# Patient Record
Sex: MALE | Race: WHITE | NOT HISPANIC OR LATINO | Employment: UNEMPLOYED | URBAN - NONMETROPOLITAN AREA
[De-identification: names, ages, dates, MRNs, and addresses within clinical notes are randomized per-mention and may not be internally consistent; named-entity substitution may affect disease eponyms.]

---

## 2018-01-03 ENCOUNTER — HOSPITAL ENCOUNTER (OUTPATIENT)
Dept: GENERAL RADIOLOGY | Facility: HOSPITAL | Age: 53
Discharge: HOME | End: 2018-01-03
Admitting: NURSE PRACTITIONER

## 2018-01-03 ENCOUNTER — TRANSCRIBE ORDERS (OUTPATIENT)
Dept: ADMINISTRATIVE | Facility: HOSPITAL | Age: 53
End: 2018-01-03

## 2018-01-03 DIAGNOSIS — M25.529 ELBOW PAIN, UNSPECIFIED LATERALITY: Primary | ICD-10-CM

## 2018-01-03 DIAGNOSIS — M25.529 ELBOW PAIN, UNSPECIFIED LATERALITY: ICD-10-CM

## 2018-01-03 PROCEDURE — 72110 X-RAY EXAM L-2 SPINE 4/>VWS: CPT | Performed by: RADIOLOGY

## 2018-01-03 PROCEDURE — 72110 X-RAY EXAM L-2 SPINE 4/>VWS: CPT

## 2018-01-03 PROCEDURE — 73080 X-RAY EXAM OF ELBOW: CPT

## 2018-01-03 PROCEDURE — 73080 X-RAY EXAM OF ELBOW: CPT | Performed by: RADIOLOGY

## 2018-07-06 ENCOUNTER — OFFICE VISIT (OUTPATIENT)
Dept: UROLOGY | Facility: CLINIC | Age: 53
End: 2018-07-06

## 2018-07-06 VITALS — HEIGHT: 66 IN | BODY MASS INDEX: 21.69 KG/M2 | WEIGHT: 135 LBS

## 2018-07-06 DIAGNOSIS — R97.20 ELEVATED PROSTATE SPECIFIC ANTIGEN (PSA): Primary | ICD-10-CM

## 2018-07-06 PROCEDURE — 84153 ASSAY OF PSA TOTAL: CPT | Performed by: UROLOGY

## 2018-07-06 PROCEDURE — 99204 OFFICE O/P NEW MOD 45 MIN: CPT | Performed by: UROLOGY

## 2018-07-06 PROCEDURE — 84154 ASSAY OF PSA FREE: CPT | Performed by: UROLOGY

## 2018-07-06 RX ORDER — GABAPENTIN 600 MG/1
600 TABLET ORAL 3 TIMES DAILY
COMMUNITY
Start: 2018-06-27

## 2018-07-06 RX ORDER — SUCRALFATE 1 G/1
1 TABLET ORAL 4 TIMES DAILY
COMMUNITY
Start: 2018-06-27 | End: 2020-10-09

## 2018-07-06 RX ORDER — BUSPIRONE HYDROCHLORIDE 7.5 MG/1
7.5 TABLET ORAL 4 TIMES DAILY
COMMUNITY
Start: 2018-06-27 | End: 2020-10-09

## 2018-07-06 RX ORDER — ALBUTEROL SULFATE 90 UG/1
1 AEROSOL, METERED RESPIRATORY (INHALATION) EVERY 4 HOURS PRN
COMMUNITY
Start: 2018-06-27 | End: 2020-10-09

## 2018-07-06 NOTE — PROGRESS NOTES
Chief Complaint:          Chief Complaint   Patient presents with   • Elevated PSA       HPI:   52 y.o. male.  52-year-old white male referred with an elevated PSA of 9.8.  He thinks his dad may have had cancer but is not sure.  He has no lower urinary tract symptomatology specifically, frequency, urgency, burning, blood in the urine etc. he's to see a heart physician but is not sure why.  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  He's had an appendectomy and facial surgery he has low back pain and is a heavy smoker.    Past Medical History:        Past Medical History:   Diagnosis Date   • Elevated PSA          Current Meds:     Current Outpatient Prescriptions   Medication Sig Dispense Refill   • busPIRone (BUSPAR) 7.5 MG tablet      • gabapentin (NEURONTIN) 600 MG tablet      • sucralfate (CARAFATE) 1 g tablet      • VENTOLIN  (90 Base) MCG/ACT inhaler        No current facility-administered medications for this visit.         Allergies:      Allergies   Allergen Reactions   • Penicillins Hives        Past Surgical History:     Past Surgical History:   Procedure Laterality Date   • APPENDECTOMY     • FACIAL RECONSTRUCTION SURGERY           Social History:     Social History     Social History   • Marital status:      Spouse name: N/A   • Number of children: N/A   • Years of education: N/A     Occupational History   • Not on file.     Social History Main Topics   • Smoking status: Current Every Day Smoker   • Smokeless tobacco: Never Used   • Alcohol use No   • Drug use: No   • Sexual activity: Not on file     Other Topics Concern   • Not on file     Social History Narrative   • No narrative on file       Family History:     Family History   Problem Relation Age of Onset   • No Known Problems Father    • No Known Problems Mother        Review of Systems:     Review of Systems   Constitutional: Negative.    HENT: Negative.    Eyes: Negative.    Respiratory: Negative.    Cardiovascular:  Negative.    Gastrointestinal: Negative.    Endocrine: Negative.    Musculoskeletal: Negative.    Allergic/Immunologic: Negative.    Neurological: Negative.    Hematological: Negative.    Psychiatric/Behavioral: Negative.        Physical Exam:     Physical Exam   Constitutional: He is oriented to person, place, and time. He appears well-developed and well-nourished.   HENT:   Head: Normocephalic and atraumatic.   Eyes: Conjunctivae and EOM are normal. Pupils are equal, round, and reactive to light.   Neck: Normal range of motion.   Cardiovascular: Normal rate, regular rhythm, normal heart sounds and intact distal pulses.    Pulmonary/Chest: Effort normal and breath sounds normal.   Abdominal: Soft. Bowel sounds are normal.   Genitourinary:   Genitourinary Comments: Soft nontender abdomen with no organomegaly, rigidity, or tenderness.  He has normal external genitalia and uncircumcised phallus with a freely movable foreskin bilaterally descended testes without masses there is no inguinal hernias adenopathy or abnormalities he had good rectal tone and a large nodular firm prostate.  There is no nodularity or any suspicious rectal abnormalities     Musculoskeletal: Normal range of motion.   Neurological: He is alert and oriented to person, place, and time. He has normal reflexes.   Skin: Skin is warm and dry.   Psychiatric: He has a normal mood and affect. His behavior is normal. Judgment and thought content normal.   Nursing note and vitals reviewed.      I have reviewed the following portions of the patient's history: allergies, current medications, past family history, past medical history, past social history, past surgical history, problem list and ROS and confirm it's accurate.      Procedure:       Assessment/Plan:   Elevated prostate specific antigen-we discussed the diagnosis of elevated prostate-specific antigen.  I explained the pathophysiology of PSA.  It is a serine protease that's function in the male  reproductive tract is to facilitate the liquefaction of semen.  It is for this reason the body does not want it freely floating in the serum and why typically bound tightly to albumin.  We discussed why we used both a PSA free and total to determine the need for more aggressive therapy I discussed the normal range.  Additionally it was in the range of 1-4 but more recently has been downgraded to something less than 2 or even approaching 1.  I discussed the risk of family history particularly the fact that the average male has a 14% risk of prostate cancer and that in the face of a positive diagnosis in a father it will tablet and any other first-generation relative continued tablet insofar that a father and brother with prostate cancer will produce almost a 50% risk of prostate cancer.  I discussed the use of the temporal use of PSA as the best option for monitoring.  We also discussed the fact that an elevated PSA is an isolated event does not mean that this is prostate cancer and should not engender worry in this regard. I discussed other things that can elevate PSA including constipation, prostatitis, infection, recent intercourse etc. as well as the risks and benefits associated with this.  Also discussed the fact that this is with a dilutional test and as a consequence of such were present produce slight variations on a single specimen.  Further discussed the risks and benefits of a prostate biopsy as well.     Patient's Body mass index is 21.79 kg/m². BMI is within normal parameters. No follow-up required.          This document has been electronically signed by LUPE RAE MD July 6, 2018 9:17 AM

## 2018-07-07 LAB
PSA FREE MFR SERPL: 21.3 %
PSA FREE SERPL-MCNC: 1.19 NG/ML
PSA SERPL-MCNC: 5.6 NG/ML (ref 0–4)

## 2018-07-09 ENCOUNTER — TELEPHONE (OUTPATIENT)
Dept: UROLOGY | Facility: CLINIC | Age: 53
End: 2018-07-09

## 2018-07-09 ENCOUNTER — TELEPHONE (OUTPATIENT)
Dept: GASTROENTEROLOGY | Facility: CLINIC | Age: 53
End: 2018-07-09

## 2018-07-09 NOTE — TELEPHONE ENCOUNTER
I got an in-basket message stating that the patient called for PSA results. I called the patient and told him that his PSA was slightly elevated

## 2018-07-11 ENCOUNTER — TRANSCRIBE ORDERS (OUTPATIENT)
Dept: ADMINISTRATIVE | Facility: HOSPITAL | Age: 53
End: 2018-07-11

## 2018-07-11 DIAGNOSIS — M54.16 LUMBAR RADICULAR PAIN: Primary | ICD-10-CM

## 2018-07-11 DIAGNOSIS — M54.12 CERVICAL RADICULITIS: ICD-10-CM

## 2018-07-13 ENCOUNTER — HOSPITAL ENCOUNTER (OUTPATIENT)
Dept: CT IMAGING | Facility: HOSPITAL | Age: 53
Discharge: HOME | End: 2018-07-13

## 2018-07-13 ENCOUNTER — HOSPITAL ENCOUNTER (OUTPATIENT)
Dept: CT IMAGING | Facility: HOSPITAL | Age: 53
Discharge: HOME | End: 2018-07-13
Admitting: NURSE PRACTITIONER

## 2018-07-13 DIAGNOSIS — M54.12 CERVICAL RADICULITIS: ICD-10-CM

## 2018-07-13 DIAGNOSIS — M54.16 LUMBAR RADICULAR PAIN: ICD-10-CM

## 2018-07-13 PROCEDURE — 72131 CT LUMBAR SPINE W/O DYE: CPT

## 2018-07-13 PROCEDURE — 72131 CT LUMBAR SPINE W/O DYE: CPT | Performed by: RADIOLOGY

## 2018-07-13 PROCEDURE — 72125 CT NECK SPINE W/O DYE: CPT

## 2018-07-13 PROCEDURE — 72125 CT NECK SPINE W/O DYE: CPT | Performed by: RADIOLOGY

## 2018-07-31 ENCOUNTER — OFFICE VISIT (OUTPATIENT)
Dept: CARDIOLOGY | Facility: CLINIC | Age: 53
End: 2018-07-31

## 2018-07-31 DIAGNOSIS — R06.09 DYSPNEA ON EXERTION: ICD-10-CM

## 2018-07-31 DIAGNOSIS — R07.9 CHEST PAIN, UNSPECIFIED TYPE: Primary | ICD-10-CM

## 2018-07-31 PROCEDURE — 93000 ELECTROCARDIOGRAM COMPLETE: CPT | Performed by: INTERNAL MEDICINE

## 2018-07-31 PROCEDURE — 99204 OFFICE O/P NEW MOD 45 MIN: CPT | Performed by: INTERNAL MEDICINE

## 2018-07-31 RX ORDER — ACETAMINOPHEN AND CODEINE PHOSPHATE 300; 30 MG/1; MG/1
1 TABLET ORAL EVERY 4 HOURS PRN
Refills: 0 | COMMUNITY
Start: 2018-07-24 | End: 2020-10-09

## 2018-07-31 NOTE — PROGRESS NOTES
[facility] CARDIOLOGY  [address]  [place] [address]  Phone: [phone]  Fax: [phone]    07/31/2018    Chief Complaint: New Patient for chest pain    History:   Lui Medina is a 53 y.o. male seen as a self referral, No cardiac hx. 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 No cardiac evaluation as adult. Heart or chest pain called spasms daily lasting 30 to 60 minutes. Some SOB. ASA helps.  Leg pains. Falls easily. Smoker. Chronic back pain.      Past Medical History:   Diagnosis Date   • Elevated PSA        Past Social History:  Social History     Social History   • Marital status:      Social History Main Topics   • Smoking status: Current Every Day Smoker   • Smokeless tobacco: Never Used   • Alcohol use No   • Drug use: No     Other Topics Concern   • Not on file       Past Family History:  Family History   Problem Relation Age of Onset   • No Known Problems Father    • No Known Problems Mother        Review of Systems:   Please see HPI  Constitution: No chills, no rigors, no unexplained weight loss or weight gain  Eyes:  No diplopia, no blurred vision, no loss of vision, conjunctiva is pink and sclera is anicteric  ENT:  No tinnitus, no otorrhea, no epistaxis, no sore throat   Respiratory: No cough, no hemoptysis  Cardiovascular: see HPI  Gastrointestinal: No nausea, no vomiting, no hematemesis, no diarrhea or constipation, no melena  Genitourinary: No frequency of dysuria no hematuria  Integument: No pruritis and  no skin rash  Hematologic / Lymphatic: No excessive bleeding, easy bruising, fatigue, lymphadenopathy and petechiae  Musculoskeletal: No joint pain, joint stiffness, joint swelling, muscle pain, muscle weakness and neck pain  Neurological: No dizziness, headaches, light headedness, seizures and vertigo  Endocrine: No frequent urination and nocturia, temperature intolerance, weight gain, unintended and weight loss,  unintended      Current Outpatient Prescriptions on File Prior to Visit   Medication Sig Dispense Refill   • busPIRone (BUSPAR) 7.5 MG tablet      • gabapentin (NEURONTIN) 600 MG tablet      • sucralfate (CARAFATE) 1 g tablet      • VENTOLIN  (90 Base) MCG/ACT inhaler        No current facility-administered medications on file prior to visit.        Allergies   Allergen Reactions   • Penicillins Hives       Objective:  There were no vitals filed for this visit.  Comfortable NAD  PERRL, conjunctiva clear  Neck supple, no JVD or thyromegaly appreciated  S1/S2 RRR, no m/r/g  Lungs CTA B, normal effort  Abdomen S/NT/ND (+) BS, no HSM appreciated  Extremities warm, no clubbing, cyanosis, or edema  Normal gait  No visible or palpable skin lesions  A/Ox4, mood and affect appropriate  Pulse exam: Fredrick's:    DATA:                              ECG 12 Lead  Date/Time: 7/31/2018 3:32 PM  Performed by: GHAZALA BARRIENTOS  Authorized by: GHAZALA BARRIENTOS                 I personally viewed and interpreted the patient's EKG: Nml      A/P: Chest pain: evaluate with echo and  Stress test.    F/u per phone  To relay results    Thank you for allowing me to participate in the care of Lui BETSY Watsoner. Feel free to contact me directly with any further questions or concerns.

## 2018-08-06 ENCOUNTER — OFFICE VISIT (OUTPATIENT)
Dept: UROLOGY | Facility: CLINIC | Age: 53
End: 2018-08-06

## 2018-08-06 VITALS — WEIGHT: 134.92 LBS | HEIGHT: 66 IN | BODY MASS INDEX: 21.68 KG/M2

## 2018-08-06 DIAGNOSIS — R97.20 ELEVATED PROSTATE SPECIFIC ANTIGEN (PSA): Primary | ICD-10-CM

## 2018-08-06 PROCEDURE — 99213 OFFICE O/P EST LOW 20 MIN: CPT | Performed by: UROLOGY

## 2018-08-06 NOTE — PROGRESS NOTES
Chief Complaint:          Chief Complaint   Patient presents with   • Elevated PSA     follow up       HPI:   53 y.o. male.    53-year-old white male referred with an elevated PSA of 9.8.  He thinks his dad may have had cancer but is not sure.  He has no lower urinary tract symptomatology specifically, frequency, urgency, burning, blood in the urine etc. he's to see a heart physician but is not sure why.  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  He's had an appendectomy and facial surgery he has low back pain and is a heavy smoker.  Returns today [place] by his girlfriend.  His repeat PSA was 5.6 which is representative of the distinct decrease from his prior high of 9.8 his free PSA was 21.3% indicating a 14% chance of prostate cancer his only other complaint is that of circumferential low back pain and abdominal pain well above the bladder.  I indicated there is no relationship of this to elevated PSA and since he has no voiding dysfunction a course of observation is certainly indicated I'll see him back in 6 months I do not recommend intervention at this time.    Past Medical History:        Past Medical History:   Diagnosis Date   • Elevated PSA          Current Meds:     Current Outpatient Prescriptions   Medication Sig Dispense Refill   • acetaminophen-codeine (TYLENOL #3) 300-30 MG per tablet   0   • busPIRone (BUSPAR) 7.5 MG tablet      • gabapentin (NEURONTIN) 600 MG tablet      • sucralfate (CARAFATE) 1 g tablet      • VENTOLIN  (90 Base) MCG/ACT inhaler        No current facility-administered medications for this visit.         Allergies:      Allergies   Allergen Reactions   • Penicillins Hives        Past Surgical History:     Past Surgical History:   Procedure Laterality Date   • APPENDECTOMY     • FACIAL RECONSTRUCTION SURGERY           Social History:     Social History     Social History   • Marital status:      Spouse name: N/A   • Number of children: N/A   • Years of  education: N/A     Occupational History   • Not on file.     Social History Main Topics   • Smoking status: Current Every Day Smoker   • Smokeless tobacco: Never Used   • Alcohol use No   • Drug use: No   • Sexual activity: Not on file     Other Topics Concern   • Not on file     Social History Narrative   • No narrative on file       Family History:     Family History   Problem Relation Age of Onset   • No Known Problems Father    • No Known Problems Mother        Review of Systems:     Review of Systems   Constitutional: Negative.    HENT: Negative.    Eyes: Negative.    Respiratory: Negative.    Cardiovascular: Negative.    Gastrointestinal: Negative.    Endocrine: Negative.    Musculoskeletal: Negative.    Allergic/Immunologic: Negative.    Neurological: Negative.    Hematological: Negative.    Psychiatric/Behavioral: Negative.        Physical Exam:     Physical Exam   Constitutional: He is oriented to person, place, and time. He appears well-developed and well-nourished.   HENT:   Head: Normocephalic and atraumatic.   Eyes: Pupils are equal, round, and reactive to light. Conjunctivae and EOM are normal.   Neck: Normal range of motion.   Cardiovascular: Normal rate, regular rhythm, normal heart sounds and intact distal pulses.    Pulmonary/Chest: Effort normal and breath sounds normal.   Abdominal: Soft. Bowel sounds are normal.   Musculoskeletal: Normal range of motion.   Neurological: He is alert and oriented to person, place, and time. He has normal reflexes.   Skin: Skin is warm and dry.   Psychiatric: He has a normal mood and affect. His behavior is normal. Judgment and thought content normal.   Nursing note and vitals reviewed.      I have reviewed the following portions of the patient's history: allergies, current medications, past family history, past medical history, past social history, past surgical history, problem list and ROS and confirm it's accurate.      Procedure:       Assessment/Plan:    Elevated prostate specific antigen-we discussed the diagnosis of elevated prostate-specific antigen.  I explained the pathophysiology of PSA.  It is a serine protease that's function in the male reproductive tract is to facilitate the liquefaction of semen.  It is for this reason the body does not want it freely floating in the serum and why typically bound tightly to albumin.  We discussed why we used both a PSA free and total to determine the need for more aggressive therapy I discussed the normal range.  Additionally it was in the range of 1-4 but more recently has been downgraded to something less than 2 or even approaching 1.  I discussed the risk of family history particularly the fact that the average male has a 14% risk of prostate cancer and that in the face of a positive diagnosis in a father it will tablet and any other first-generation relative continued tablet insofar that a father and brother with prostate cancer will produce almost a 50% risk of prostate cancer.  I discussed the use of the temporal use of PSA as the best option for monitoring.  We also discussed the fact that an elevated PSA is an isolated event does not mean that this is prostate cancer and should not engender worry in this regard. I discussed other things that can elevate PSA including constipation, prostatitis, infection, recent intercourse etc. as well as the risks and benefits associated with this.  Also discussed the fact that this is with a dilutional test and as a consequence of such were present produce slight variations on a single specimen.  Further discussed the risks and benefits of a prostate biopsy as well.  In the PSA level I recommend only observation and a recheck in 6 months I stressed the importance of follow-up     Patient's Body mass index is 21.79 kg/m². BMI is within normal parameters. No follow-up required.          This document has been electronically signed by LUPE RAE MD August 6, 2018 10:23  AM

## 2018-08-28 ENCOUNTER — APPOINTMENT (OUTPATIENT)
Dept: NUCLEAR MEDICINE | Facility: HOSPITAL | Age: 53
End: 2018-08-28
Attending: INTERNAL MEDICINE

## 2018-08-28 ENCOUNTER — APPOINTMENT (OUTPATIENT)
Dept: CARDIOLOGY | Facility: HOSPITAL | Age: 53
End: 2018-08-28
Attending: INTERNAL MEDICINE

## 2018-09-20 ENCOUNTER — APPOINTMENT (OUTPATIENT)
Dept: CARDIOLOGY | Facility: HOSPITAL | Age: 53
End: 2018-09-20
Attending: INTERNAL MEDICINE

## 2018-10-10 ENCOUNTER — TELEPHONE (OUTPATIENT)
Dept: CARDIOLOGY | Facility: CLINIC | Age: 53
End: 2018-10-10

## 2018-10-10 NOTE — TELEPHONE ENCOUNTER
Called patient due to seeing he had missed his stress and echo appointment on 9/20/18 and then had also missed his appointment with Dr. Maciel on 10/2. Was going to see if he would like to get his test rescheduled. Wife/girlfriend asked him if he would like to talk to me and was told to take a message.    I told here to please call me back at the cardiology office so that I could get his test rescheduled. She states she will have him call me back. I have also documented this in my procedure logs.

## 2019-01-16 ENCOUNTER — TRANSCRIBE ORDERS (OUTPATIENT)
Dept: ADMINISTRATIVE | Facility: HOSPITAL | Age: 54
End: 2019-01-16

## 2019-01-16 DIAGNOSIS — R10.9 ABDOMINAL PAIN, UNSPECIFIED ABDOMINAL LOCATION: Primary | ICD-10-CM

## 2019-02-20 ENCOUNTER — APPOINTMENT (OUTPATIENT)
Dept: GENERAL RADIOLOGY | Facility: HOSPITAL | Age: 54
End: 2019-02-20

## 2019-02-20 ENCOUNTER — HOSPITAL ENCOUNTER (EMERGENCY)
Facility: HOSPITAL | Age: 54
Discharge: HOME | End: 2019-02-20
Attending: EMERGENCY MEDICINE | Admitting: EMERGENCY MEDICINE

## 2019-02-20 VITALS
SYSTOLIC BLOOD PRESSURE: 141 MMHG | HEIGHT: 66 IN | DIASTOLIC BLOOD PRESSURE: 89 MMHG | HEART RATE: 55 BPM | TEMPERATURE: 97.6 F | RESPIRATION RATE: 18 BRPM | BODY MASS INDEX: 22.5 KG/M2 | WEIGHT: 140 LBS | OXYGEN SATURATION: 100 %

## 2019-02-20 DIAGNOSIS — R07.89 ATYPICAL CHEST PAIN: Primary | ICD-10-CM

## 2019-02-20 LAB
ALBUMIN SERPL-MCNC: 4.6 G/DL (ref 3.5–5)
ALBUMIN/GLOB SERPL: 1.6 G/DL (ref 1.5–2.5)
ALP SERPL-CCNC: 54 U/L (ref 40–129)
ALT SERPL W P-5'-P-CCNC: 28 U/L (ref 10–44)
ANION GAP SERPL CALCULATED.3IONS-SCNC: 2.4 MMOL/L (ref 3.6–11.2)
AST SERPL-CCNC: 28 U/L (ref 10–34)
BASOPHILS # BLD AUTO: 0.04 10*3/MM3 (ref 0–0.3)
BASOPHILS NFR BLD AUTO: 0.6 % (ref 0–2)
BILIRUB SERPL-MCNC: 0.3 MG/DL (ref 0.2–1.8)
BUN BLD-MCNC: 15 MG/DL (ref 7–21)
BUN/CREAT SERPL: 14 (ref 7–25)
CALCIUM SPEC-SCNC: 9.1 MG/DL (ref 7.7–10)
CHLORIDE SERPL-SCNC: 105 MMOL/L (ref 99–112)
CO2 SERPL-SCNC: 30.6 MMOL/L (ref 24.3–31.9)
CREAT BLD-MCNC: 1.07 MG/DL (ref 0.43–1.29)
DEPRECATED RDW RBC AUTO: 44.1 FL (ref 37–54)
EOSINOPHIL # BLD AUTO: 0.13 10*3/MM3 (ref 0–0.7)
EOSINOPHIL NFR BLD AUTO: 1.9 % (ref 0–5)
ERYTHROCYTE [DISTWIDTH] IN BLOOD BY AUTOMATED COUNT: 13.3 % (ref 11.5–14.5)
GFR SERPL CREATININE-BSD FRML MDRD: 73 ML/MIN/1.73
GLOBULIN UR ELPH-MCNC: 2.9 GM/DL
GLUCOSE BLD-MCNC: 99 MG/DL (ref 70–110)
HCT VFR BLD AUTO: 48.6 % (ref 42–52)
HGB BLD-MCNC: 17 G/DL (ref 14–18)
HOLD SPECIMEN: NORMAL
HOLD SPECIMEN: NORMAL
IMM GRANULOCYTES # BLD AUTO: 0.03 10*3/MM3 (ref 0–0.03)
IMM GRANULOCYTES NFR BLD AUTO: 0.4 % (ref 0–0.5)
LYMPHOCYTES # BLD AUTO: 2.63 10*3/MM3 (ref 1–3)
LYMPHOCYTES NFR BLD AUTO: 38.6 % (ref 21–51)
MCH RBC QN AUTO: 32 PG (ref 27–33)
MCHC RBC AUTO-ENTMCNC: 35 G/DL (ref 33–37)
MCV RBC AUTO: 91.4 FL (ref 80–94)
MONOCYTES # BLD AUTO: 0.74 10*3/MM3 (ref 0.1–0.9)
MONOCYTES NFR BLD AUTO: 10.9 % (ref 0–10)
NEUTROPHILS # BLD AUTO: 3.24 10*3/MM3 (ref 1.4–6.5)
NEUTROPHILS NFR BLD AUTO: 47.6 % (ref 30–70)
OSMOLALITY SERPL CALC.SUM OF ELEC: 276.5 MOSM/KG (ref 273–305)
PLATELET # BLD AUTO: 208 10*3/MM3 (ref 130–400)
PMV BLD AUTO: 9.8 FL (ref 6–10)
POTASSIUM BLD-SCNC: 4.2 MMOL/L (ref 3.5–5.3)
PROT SERPL-MCNC: 7.5 G/DL (ref 6–8)
RBC # BLD AUTO: 5.32 10*6/MM3 (ref 4.7–6.1)
SODIUM BLD-SCNC: 138 MMOL/L (ref 135–153)
TROPONIN I SERPL-MCNC: <0.006 NG/ML
WBC NRBC COR # BLD: 6.81 10*3/MM3 (ref 4.5–12.5)
WHOLE BLOOD HOLD SPECIMEN: NORMAL
WHOLE BLOOD HOLD SPECIMEN: NORMAL

## 2019-02-20 PROCEDURE — 93005 ELECTROCARDIOGRAM TRACING: CPT | Performed by: EMERGENCY MEDICINE

## 2019-02-20 PROCEDURE — 71046 X-RAY EXAM CHEST 2 VIEWS: CPT | Performed by: RADIOLOGY

## 2019-02-20 PROCEDURE — 84484 ASSAY OF TROPONIN QUANT: CPT | Performed by: EMERGENCY MEDICINE

## 2019-02-20 PROCEDURE — 80053 COMPREHEN METABOLIC PANEL: CPT | Performed by: EMERGENCY MEDICINE

## 2019-02-20 PROCEDURE — 85025 COMPLETE CBC W/AUTO DIFF WBC: CPT | Performed by: EMERGENCY MEDICINE

## 2019-02-20 PROCEDURE — 93010 ELECTROCARDIOGRAM REPORT: CPT | Performed by: INTERNAL MEDICINE

## 2019-02-20 PROCEDURE — 99285 EMERGENCY DEPT VISIT HI MDM: CPT

## 2019-02-20 PROCEDURE — 71046 X-RAY EXAM CHEST 2 VIEWS: CPT

## 2019-02-20 RX ORDER — ASPIRIN 325 MG
325 TABLET ORAL ONCE
Status: COMPLETED | OUTPATIENT
Start: 2019-02-20 | End: 2019-02-20

## 2019-02-20 RX ORDER — NAPROXEN 500 MG/1
500 TABLET ORAL 2 TIMES DAILY PRN
Qty: 22 TABLET | Refills: 0 | Status: SHIPPED | OUTPATIENT
Start: 2019-02-20 | End: 2020-10-09

## 2019-02-20 RX ORDER — SODIUM CHLORIDE 0.9 % (FLUSH) 0.9 %
10 SYRINGE (ML) INJECTION AS NEEDED
Status: DISCONTINUED | OUTPATIENT
Start: 2019-02-20 | End: 2019-02-20 | Stop reason: HOSPADM

## 2019-02-20 RX ADMIN — ASPIRIN 325 MG: 325 TABLET ORAL at 17:02

## 2020-06-12 ENCOUNTER — TRANSCRIBE ORDERS (OUTPATIENT)
Dept: ADMINISTRATIVE | Facility: HOSPITAL | Age: 55
End: 2020-06-12

## 2020-06-12 ENCOUNTER — LAB (OUTPATIENT)
Dept: LAB | Facility: HOSPITAL | Age: 55
End: 2020-06-12

## 2020-06-12 DIAGNOSIS — Z01.818 PRE-OPERATIVE CLEARANCE: ICD-10-CM

## 2020-06-12 DIAGNOSIS — Z01.818 PRE-OPERATIVE CLEARANCE: Primary | ICD-10-CM

## 2020-06-12 PROCEDURE — U0002 COVID-19 LAB TEST NON-CDC: HCPCS

## 2020-06-12 PROCEDURE — C9803 HOPD COVID-19 SPEC COLLECT: HCPCS

## 2020-06-12 PROCEDURE — U0004 COV-19 TEST NON-CDC HGH THRU: HCPCS

## 2020-06-15 LAB
REF LAB TEST METHOD: NORMAL
SARS-COV-2 RNA RESP QL NAA+PROBE: NOT DETECTED

## 2020-07-01 ENCOUNTER — TRANSCRIBE ORDERS (OUTPATIENT)
Dept: ADMINISTRATIVE | Facility: HOSPITAL | Age: 55
End: 2020-07-01

## 2020-07-01 DIAGNOSIS — S09.93XA BLUNT TRAUMA OF FACE, INITIAL ENCOUNTER: Primary | ICD-10-CM

## 2020-09-25 ENCOUNTER — TRANSCRIBE ORDERS (OUTPATIENT)
Dept: ADMINISTRATIVE | Facility: HOSPITAL | Age: 55
End: 2020-09-25

## 2020-09-25 DIAGNOSIS — R10.33 PERIUMBILICAL PAIN: Primary | ICD-10-CM

## 2020-09-30 ENCOUNTER — HOSPITAL ENCOUNTER (OUTPATIENT)
Dept: ULTRASOUND IMAGING | Facility: HOSPITAL | Age: 55
Discharge: HOME | End: 2020-09-30
Admitting: FAMILY MEDICINE

## 2020-09-30 DIAGNOSIS — R10.33 PERIUMBILICAL PAIN: ICD-10-CM

## 2020-09-30 PROCEDURE — 76700 US EXAM ABDOM COMPLETE: CPT | Performed by: RADIOLOGY

## 2020-09-30 PROCEDURE — 76700 US EXAM ABDOM COMPLETE: CPT

## 2020-10-09 ENCOUNTER — OFFICE VISIT (OUTPATIENT)
Dept: UROLOGY | Facility: CLINIC | Age: 55
End: 2020-10-09

## 2020-10-09 VITALS — HEIGHT: 66 IN | WEIGHT: 147 LBS | TEMPERATURE: 98.7 F | BODY MASS INDEX: 23.63 KG/M2

## 2020-10-09 DIAGNOSIS — R97.20 ELEVATED PROSTATE SPECIFIC ANTIGEN (PSA): Primary | ICD-10-CM

## 2020-10-09 PROCEDURE — 84154 ASSAY OF PSA FREE: CPT | Performed by: UROLOGY

## 2020-10-09 PROCEDURE — 84153 ASSAY OF PSA TOTAL: CPT | Performed by: UROLOGY

## 2020-10-09 PROCEDURE — 99203 OFFICE O/P NEW LOW 30 MIN: CPT | Performed by: UROLOGY

## 2020-10-09 RX ORDER — TAMSULOSIN HYDROCHLORIDE 0.4 MG/1
1 CAPSULE ORAL DAILY
COMMUNITY
Start: 2020-08-27 | End: 2020-10-09

## 2020-10-09 RX ORDER — HYDROCODONE BITARTRATE AND ACETAMINOPHEN 7.5; 325 MG/1; MG/1
1 TABLET ORAL EVERY 6 HOURS PRN
COMMUNITY

## 2020-10-09 NOTE — PROGRESS NOTES
Chief Complaint:          Chief Complaint   Patient presents with   • Elevated PSA     2 yr f/u       HPI:   55 y.o. male is referred for an elevated PSA was 5.6 on July 6, 2018 and again on August 27, 2020 was 6.9 he has a significant positive family history and I have seen him previously he has no urgency.  He did have a failure to keep his appointment he has no burning, blood in the urine, discharge, fevers, chills nausea vomiting etc.  He has a normal phallus normal testes large smooth prostate have a free and total PSA pending and I will follow-up with him based on this      Past Medical History:        Past Medical History:   Diagnosis Date   • Elevated PSA          Current Meds:     Current Outpatient Medications   Medication Sig Dispense Refill   • gabapentin (NEURONTIN) 600 MG tablet Take 600 mg by mouth 3 (Three) Times a Day.     • HYDROcodone-acetaminophen (NORCO) 7.5-325 MG per tablet Take 1 tablet by mouth Every 6 (Six) Hours As Needed for Moderate Pain .       No current facility-administered medications for this visit.         Allergies:      Allergies   Allergen Reactions   • Codeine Nausea Only   • Penicillins Hives        Past Surgical History:     Past Surgical History:   Procedure Laterality Date   • APPENDECTOMY     • FACIAL RECONSTRUCTION SURGERY           Social History:     Social History     Socioeconomic History   • Marital status:      Spouse name: Not on file   • Number of children: Not on file   • Years of education: Not on file   • Highest education level: Not on file   Tobacco Use   • Smoking status: Current Every Day Smoker   • Smokeless tobacco: Never Used   Substance and Sexual Activity   • Alcohol use: No   • Drug use: No       Family History:     Family History   Problem Relation Age of Onset   • No Known Problems Father    • No Known Problems Mother        Review of Systems:     Review of Systems   Constitutional: Negative.    HENT: Negative.    Eyes: Negative.       Respiratory: Negative.    Cardiovascular: Negative.    Gastrointestinal: Negative.    Endocrine: Negative.    Musculoskeletal: Negative.    Allergic/Immunologic: Negative.    Neurological: Negative.    Hematological: Negative.    Psychiatric/Behavioral: Negative.        Physical Exam:     Physical Exam  Vitals signs and nursing note reviewed.   Constitutional:       Appearance: He is well-developed.   HENT:      Head: Normocephalic and atraumatic.   Eyes:      Conjunctiva/sclera: Conjunctivae normal.      Pupils: Pupils are equal, round, and reactive to light.   Neck:      Musculoskeletal: Normal range of motion.   Cardiovascular:      Rate and Rhythm: Normal rate and regular rhythm.      Heart sounds: Normal heart sounds.   Pulmonary:      Effort: Pulmonary effort is normal.      Breath sounds: Normal breath sounds.   Abdominal:      General: Bowel sounds are normal.      Palpations: Abdomen is soft.   Genitourinary:     Comments: Soft nontender abdomen with no organomegaly, rigidity, or tenderness.  He has normal external genitalia and uncircumcised phallus with a freely movable foreskin bilaterally descended testes without masses there is no inguinal hernias adenopathy or abnormalities he had good rectal tone and a large smooth firm prostate.  There is no nodularity or any suspicious rectal abnormalities    Musculoskeletal: Normal range of motion.   Skin:     General: Skin is warm and dry.   Neurological:      Mental Status: He is alert and oriented to person, place, and time.      Deep Tendon Reflexes: Reflexes are normal and symmetric.   Psychiatric:         Behavior: Behavior normal.         Thought Content: Thought content normal.         Judgment: Judgment normal.         I have reviewed the following portions of the patient's history: allergies, current medications, past family history, past medical history, past social history, past surgical history, problem list and ROS and confirm it's  accurate.      Procedure:       Assessment/Plan:   Elevated prostate specific antigen-we discussed the diagnosis of elevated prostate-specific antigen.  I explained the pathophysiology of PSA.  It is a serine protease that's function in the male reproductive tract is to facilitate the liquefaction of semen.  It is for this reason the body does not want it freely floating in the serum and why typically bound tightly to albumin.  We discussed why we used both a PSA free and total to determine the need for more aggressive therapy I discussed the normal range.  Additionally it was in the range of 1-4 but more recently has been downgraded to something less than 2 or even approaching 1.  I discussed the risk of family history particularly the fact that the average male has a 14% risk of prostate cancer and that in the face of a positive diagnosis in a father it will tablet and any other first-generation relative continued tablet insofar that a father and brother with prostate cancer will produce almost a 50% risk of prostate cancer.  I discussed the use of the temporal use of PSA as the best option for monitoring.  We also discussed the fact that an elevated PSA is an isolated event does not mean that this is prostate cancer and should not engender worry in this regard. I discussed other things that can elevate PSA including constipation, prostatitis, infection, recent intercourse etc. as well as the risks and benefits associated with this.  Also discussed the fact that this is with a dilutional test and as a consequence of such were present produce slight variations on a single specimen.  Further discussed the risks and benefits of a prostate biopsy as well.            Patient's Body mass index is 23.73 kg/m². BMI is within normal parameters. No follow-up required..              This document has been electronically signed by LUPE RAE MD October 9, 2020 15:32 EDT

## 2020-10-12 LAB
PSA FREE MFR SERPL: 28.9 %
PSA FREE SERPL-MCNC: 1.76 NG/ML
PSA SERPL-MCNC: 6.1 NG/ML (ref 0–4)

## 2020-11-10 ENCOUNTER — OFFICE VISIT (OUTPATIENT)
Dept: UROLOGY | Facility: CLINIC | Age: 55
End: 2020-11-10

## 2020-11-10 VITALS — TEMPERATURE: 98.9 F | BODY MASS INDEX: 23.63 KG/M2 | WEIGHT: 147.05 LBS | HEIGHT: 66 IN

## 2020-11-10 DIAGNOSIS — R97.20 ELEVATED PROSTATE SPECIFIC ANTIGEN (PSA): Primary | ICD-10-CM

## 2020-11-10 PROCEDURE — 99213 OFFICE O/P EST LOW 20 MIN: CPT | Performed by: UROLOGY

## 2020-11-10 NOTE — PROGRESS NOTES
Chief Complaint:          Chief Complaint   Patient presents with   • Elevated PSA     3 WEEK FOLLOW UP       HPI:   55 y.o. male here for follow-up he had a PSA on October 9 2026.1 in 2018 it was 5.6.  On July 6, 2018 it was 9.8 most recently he is dropped to 6.1 with a free PSA of 28.9% he has swollen feet he has a strong positive family history examination is unremarkable I Paloma recommend continued observation.  Currently stable no other complaints problems PSA has not changed dramatically and if anything is dropped      Past Medical History:        Past Medical History:   Diagnosis Date   • Elevated PSA          Current Meds:     Current Outpatient Medications   Medication Sig Dispense Refill   • gabapentin (NEURONTIN) 600 MG tablet Take 600 mg by mouth 3 (Three) Times a Day.     • HYDROcodone-acetaminophen (NORCO) 7.5-325 MG per tablet Take 1 tablet by mouth Every 6 (Six) Hours As Needed for Moderate Pain .       No current facility-administered medications for this visit.         Allergies:      Allergies   Allergen Reactions   • Codeine Nausea Only   • Penicillins Hives        Past Surgical History:     Past Surgical History:   Procedure Laterality Date   • APPENDECTOMY     • FACIAL RECONSTRUCTION SURGERY           Social History:     Social History     Socioeconomic History   • Marital status:      Spouse name: Not on file   • Number of children: Not on file   • Years of education: Not on file   • Highest education level: Not on file   Tobacco Use   • Smoking status: Current Every Day Smoker     Types: Cigarettes   • Smokeless tobacco: Never Used   Substance and Sexual Activity   • Alcohol use: No   • Drug use: No       Family History:     Family History   Problem Relation Age of Onset   • No Known Problems Father    • No Known Problems Mother        Review of Systems:     Review of Systems   Constitutional: Negative.    HENT: Negative.    Eyes: Negative.    Respiratory: Negative.    Cardiovascular:  Negative.    Gastrointestinal: Negative.    Endocrine: Negative.    Musculoskeletal: Negative.    Allergic/Immunologic: Negative.    Neurological: Negative.    Hematological: Negative.    Psychiatric/Behavioral: Negative.        Physical Exam:     Physical Exam  Vitals signs and nursing note reviewed.   Constitutional:       Appearance: He is well-developed.   HENT:      Head: Normocephalic and atraumatic.   Eyes:      Conjunctiva/sclera: Conjunctivae normal.      Pupils: Pupils are equal, round, and reactive to light.   Neck:      Musculoskeletal: Normal range of motion.   Cardiovascular:      Rate and Rhythm: Normal rate and regular rhythm.      Heart sounds: Normal heart sounds.   Pulmonary:      Effort: Pulmonary effort is normal.      Breath sounds: Normal breath sounds.   Abdominal:      General: Bowel sounds are normal.      Palpations: Abdomen is soft.   Genitourinary:     Penis: Normal.       Scrotum/Testes: Normal.      Prostate: Normal.      Rectum: Normal.   Musculoskeletal: Normal range of motion.   Skin:     General: Skin is warm and dry.   Neurological:      Mental Status: He is alert and oriented to person, place, and time.      Deep Tendon Reflexes: Reflexes are normal and symmetric.   Psychiatric:         Behavior: Behavior normal.         Thought Content: Thought content normal.         Judgment: Judgment normal.         I have reviewed the following portions of the patient's history: allergies, current medications, past family history, past medical history, past social history, past surgical history, problem list and ROS and confirm it's accurate.      Procedure:       Assessment/Plan:   Elevated prostate specific antigen-we discussed the diagnosis of elevated prostate-specific antigen.  I explained the pathophysiology of PSA.  It is a serine protease that's function in the male reproductive tract is to facilitate the liquefaction of semen.  It is for this reason the body does not want it freely  floating in the serum and why typically bound tightly to albumin.  We discussed why we used both a PSA free and total to determine the need for more aggressive therapy I discussed the normal range.  Additionally it was in the range of 1-4 but more recently has been downgraded to something less than 2 or even approaching 1.  I discussed the risk of family history particularly the fact that the average male has a 14% risk of prostate cancer and that in the face of a positive diagnosis in a father it will tablet and any other first-generation relative continued tablet insofar that a father and brother with prostate cancer will produce almost a 50% risk of prostate cancer.  I discussed the use of the temporal use of PSA as the best option for monitoring.  We also discussed the fact that an elevated PSA is an isolated event does not mean that this is prostate cancer and should not engender worry in this regard. I discussed other things that can elevate PSA including constipation, prostatitis, infection, recent intercourse etc. as well as the risks and benefits associated with this.  Also discussed the fact that this is with a dilutional test and as a consequence of such were present produce slight variations on a single specimen.  Further discussed the risks and benefits of a prostate biopsy as well.        Patient reports that he is not currently experiencing any symptoms of urinary incontinence.      Patient's Body mass index is 23.75 kg/m². BMI is within normal parameters. No follow-up required..              This document has been electronically signed by LUPE RAE MD November 10, 2020 13:57 EST

## 2021-09-30 ENCOUNTER — TRANSCRIBE ORDERS (OUTPATIENT)
Dept: ADMINISTRATIVE | Facility: HOSPITAL | Age: 56
End: 2021-09-30

## 2021-09-30 DIAGNOSIS — R10.31 ABDOMINAL PAIN, RIGHT LOWER QUADRANT: Primary | ICD-10-CM

## 2022-01-28 ENCOUNTER — TRANSCRIBE ORDERS (OUTPATIENT)
Dept: ADMINISTRATIVE | Facility: HOSPITAL | Age: 57
End: 2022-01-28

## 2022-01-28 DIAGNOSIS — M50.30 DDD (DEGENERATIVE DISC DISEASE), CERVICAL: Primary | ICD-10-CM

## 2023-03-03 ENCOUNTER — TRANSCRIBE ORDERS (OUTPATIENT)
Dept: ADMINISTRATIVE | Facility: HOSPITAL | Age: 58
End: 2023-03-03
Payer: COMMERCIAL

## 2023-03-03 DIAGNOSIS — R10.84 ABDOMINAL PAIN, GENERALIZED: Primary | ICD-10-CM

## 2023-03-27 ENCOUNTER — TRANSCRIBE ORDERS (OUTPATIENT)
Dept: ADMINISTRATIVE | Facility: HOSPITAL | Age: 58
End: 2023-03-27
Payer: COMMERCIAL

## 2023-03-27 DIAGNOSIS — R10.84 ABDOMINAL PAIN, GENERALIZED: Primary | ICD-10-CM

## 2023-04-27 ENCOUNTER — TRANSCRIBE ORDERS (OUTPATIENT)
Dept: ADMINISTRATIVE | Facility: HOSPITAL | Age: 58
End: 2023-04-27
Payer: COMMERCIAL

## 2023-04-27 DIAGNOSIS — R51.9 LEFT FACIAL PAIN: Primary | ICD-10-CM

## 2023-05-12 ENCOUNTER — HOSPITAL ENCOUNTER (OUTPATIENT)
Dept: CT IMAGING | Facility: HOSPITAL | Age: 58
Discharge: HOME | End: 2023-05-12
Payer: COMMERCIAL

## 2023-05-12 DIAGNOSIS — R51.9 LEFT FACIAL PAIN: ICD-10-CM

## 2023-05-12 DIAGNOSIS — R10.84 ABDOMINAL PAIN, GENERALIZED: ICD-10-CM

## 2023-05-12 LAB — CREAT BLDA-MCNC: 1 MG/DL (ref 0.6–1.3)

## 2023-05-12 PROCEDURE — 74178 CT ABD&PLV WO CNTR FLWD CNTR: CPT

## 2023-05-12 PROCEDURE — 70486 CT MAXILLOFACIAL W/O DYE: CPT

## 2023-05-12 PROCEDURE — 82565 ASSAY OF CREATININE: CPT

## 2023-05-12 PROCEDURE — 70486 CT MAXILLOFACIAL W/O DYE: CPT | Performed by: RADIOLOGY

## 2023-05-12 PROCEDURE — 25510000001 IOPAMIDOL 61 % SOLUTION: Performed by: FAMILY MEDICINE

## 2023-05-12 RX ADMIN — IOPAMIDOL 80 ML: 612 INJECTION, SOLUTION INTRAVENOUS at 11:16

## 2023-07-31 ENCOUNTER — APPOINTMENT (OUTPATIENT)
Dept: CT IMAGING | Facility: HOSPITAL | Age: 58
End: 2023-07-31
Payer: COMMERCIAL

## 2023-07-31 ENCOUNTER — HOSPITAL ENCOUNTER (EMERGENCY)
Facility: HOSPITAL | Age: 58
Discharge: HOME | End: 2023-07-31
Attending: EMERGENCY MEDICINE
Payer: COMMERCIAL

## 2023-07-31 VITALS
HEIGHT: 66 IN | DIASTOLIC BLOOD PRESSURE: 82 MMHG | BODY MASS INDEX: 22.02 KG/M2 | SYSTOLIC BLOOD PRESSURE: 116 MMHG | OXYGEN SATURATION: 98 % | RESPIRATION RATE: 18 BRPM | WEIGHT: 137 LBS | TEMPERATURE: 98.1 F | HEART RATE: 70 BPM

## 2023-07-31 DIAGNOSIS — L02.01 FACIAL ABSCESS: Primary | ICD-10-CM

## 2023-07-31 LAB
ALBUMIN SERPL-MCNC: 4.1 G/DL (ref 3.5–5.2)
ALBUMIN/GLOB SERPL: 1.1 G/DL
ALP SERPL-CCNC: 100 U/L (ref 39–117)
ALT SERPL W P-5'-P-CCNC: 11 U/L (ref 1–41)
ANION GAP SERPL CALCULATED.3IONS-SCNC: 10.9 MMOL/L (ref 5–15)
AST SERPL-CCNC: 20 U/L (ref 1–40)
BASOPHILS # BLD AUTO: 0.04 10*3/MM3 (ref 0–0.2)
BASOPHILS NFR BLD AUTO: 0.4 % (ref 0–1.5)
BILIRUB SERPL-MCNC: 0.3 MG/DL (ref 0–1.2)
BUN SERPL-MCNC: 19 MG/DL (ref 6–20)
BUN/CREAT SERPL: 20.7 (ref 7–25)
CALCIUM SPEC-SCNC: 9.3 MG/DL (ref 8.6–10.5)
CHLORIDE SERPL-SCNC: 101 MMOL/L (ref 98–107)
CO2 SERPL-SCNC: 25.1 MMOL/L (ref 22–29)
CREAT SERPL-MCNC: 0.92 MG/DL (ref 0.76–1.27)
CRP SERPL-MCNC: 2.18 MG/DL (ref 0–0.5)
D-LACTATE SERPL-SCNC: 1.1 MMOL/L (ref 0.5–2)
DEPRECATED RDW RBC AUTO: 43.1 FL (ref 37–54)
EGFRCR SERPLBLD CKD-EPI 2021: 97 ML/MIN/1.73
EOSINOPHIL # BLD AUTO: 0.07 10*3/MM3 (ref 0–0.4)
EOSINOPHIL NFR BLD AUTO: 0.7 % (ref 0.3–6.2)
ERYTHROCYTE [DISTWIDTH] IN BLOOD BY AUTOMATED COUNT: 13.1 % (ref 12.3–15.4)
GLOBULIN UR ELPH-MCNC: 3.6 GM/DL
GLUCOSE SERPL-MCNC: 101 MG/DL (ref 65–99)
HCT VFR BLD AUTO: 49 % (ref 37.5–51)
HGB BLD-MCNC: 16.1 G/DL (ref 13–17.7)
IMM GRANULOCYTES # BLD AUTO: 0.04 10*3/MM3 (ref 0–0.05)
IMM GRANULOCYTES NFR BLD AUTO: 0.4 % (ref 0–0.5)
LYMPHOCYTES # BLD AUTO: 2.36 10*3/MM3 (ref 0.7–3.1)
LYMPHOCYTES NFR BLD AUTO: 22.6 % (ref 19.6–45.3)
MCH RBC QN AUTO: 29.7 PG (ref 26.6–33)
MCHC RBC AUTO-ENTMCNC: 32.9 G/DL (ref 31.5–35.7)
MCV RBC AUTO: 90.4 FL (ref 79–97)
MONOCYTES # BLD AUTO: 0.72 10*3/MM3 (ref 0.1–0.9)
MONOCYTES NFR BLD AUTO: 6.9 % (ref 5–12)
NEUTROPHILS NFR BLD AUTO: 69 % (ref 42.7–76)
NEUTROPHILS NFR BLD AUTO: 7.21 10*3/MM3 (ref 1.7–7)
NRBC BLD AUTO-RTO: 0 /100 WBC (ref 0–0.2)
PLATELET # BLD AUTO: 307 10*3/MM3 (ref 140–450)
PMV BLD AUTO: 8.6 FL (ref 6–12)
POTASSIUM SERPL-SCNC: 4.6 MMOL/L (ref 3.5–5.2)
PROCALCITONIN SERPL-MCNC: <0.02 NG/ML (ref 0–0.25)
PROT SERPL-MCNC: 7.7 G/DL (ref 6–8.5)
RBC # BLD AUTO: 5.42 10*6/MM3 (ref 4.14–5.8)
SODIUM SERPL-SCNC: 137 MMOL/L (ref 136–145)
WBC NRBC COR # BLD: 10.44 10*3/MM3 (ref 3.4–10.8)

## 2023-07-31 PROCEDURE — 87186 SC STD MICRODIL/AGAR DIL: CPT | Performed by: NURSE PRACTITIONER

## 2023-07-31 PROCEDURE — 25010000002 MORPHINE PER 10 MG: Performed by: NURSE PRACTITIONER

## 2023-07-31 PROCEDURE — 87147 CULTURE TYPE IMMUNOLOGIC: CPT | Performed by: NURSE PRACTITIONER

## 2023-07-31 PROCEDURE — 25010000002 ONDANSETRON PER 1 MG: Performed by: NURSE PRACTITIONER

## 2023-07-31 PROCEDURE — 85025 COMPLETE CBC W/AUTO DIFF WBC: CPT | Performed by: NURSE PRACTITIONER

## 2023-07-31 PROCEDURE — 25010000002 HYDROMORPHONE 1 MG/ML SOLUTION: Performed by: NURSE PRACTITIONER

## 2023-07-31 PROCEDURE — 99285 EMERGENCY DEPT VISIT HI MDM: CPT

## 2023-07-31 PROCEDURE — 25510000001 IOPAMIDOL 61 % SOLUTION: Performed by: EMERGENCY MEDICINE

## 2023-07-31 PROCEDURE — 84145 PROCALCITONIN (PCT): CPT | Performed by: NURSE PRACTITIONER

## 2023-07-31 PROCEDURE — 87040 BLOOD CULTURE FOR BACTERIA: CPT | Performed by: NURSE PRACTITIONER

## 2023-07-31 PROCEDURE — 70487 CT MAXILLOFACIAL W/DYE: CPT | Performed by: RADIOLOGY

## 2023-07-31 PROCEDURE — 25010000002 DALBAVANCIN 500 MG RECONSTITUTED SOLUTION 1 EACH VIAL: Performed by: STUDENT IN AN ORGANIZED HEALTH CARE EDUCATION/TRAINING PROGRAM

## 2023-07-31 PROCEDURE — 96375 TX/PRO/DX INJ NEW DRUG ADDON: CPT

## 2023-07-31 PROCEDURE — 87077 CULTURE AEROBIC IDENTIFY: CPT | Performed by: NURSE PRACTITIONER

## 2023-07-31 PROCEDURE — 70487 CT MAXILLOFACIAL W/DYE: CPT

## 2023-07-31 PROCEDURE — 83605 ASSAY OF LACTIC ACID: CPT | Performed by: NURSE PRACTITIONER

## 2023-07-31 PROCEDURE — 96365 THER/PROPH/DIAG IV INF INIT: CPT

## 2023-07-31 PROCEDURE — 86140 C-REACTIVE PROTEIN: CPT | Performed by: NURSE PRACTITIONER

## 2023-07-31 PROCEDURE — 87150 DNA/RNA AMPLIFIED PROBE: CPT | Performed by: NURSE PRACTITIONER

## 2023-07-31 PROCEDURE — 80053 COMPREHEN METABOLIC PANEL: CPT | Performed by: NURSE PRACTITIONER

## 2023-07-31 PROCEDURE — 36415 COLL VENOUS BLD VENIPUNCTURE: CPT

## 2023-07-31 RX ORDER — SODIUM CHLORIDE 0.9 % (FLUSH) 0.9 %
10 SYRINGE (ML) INJECTION AS NEEDED
Status: DISCONTINUED | OUTPATIENT
Start: 2023-07-31 | End: 2023-07-31 | Stop reason: HOSPADM

## 2023-07-31 RX ORDER — ONDANSETRON 2 MG/ML
4 INJECTION INTRAMUSCULAR; INTRAVENOUS ONCE
Status: COMPLETED | OUTPATIENT
Start: 2023-07-31 | End: 2023-07-31

## 2023-07-31 RX ORDER — DEXTROSE MONOHYDRATE 50 MG/ML
10 INJECTION, SOLUTION INTRAVENOUS CONTINUOUS
Status: DISCONTINUED | OUTPATIENT
Start: 2023-07-31 | End: 2023-07-31 | Stop reason: HOSPADM

## 2023-07-31 RX ADMIN — DEXTROSE MONOHYDRATE 10 ML/HR: 50 INJECTION, SOLUTION INTRAVENOUS at 19:56

## 2023-07-31 RX ADMIN — MORPHINE SULFATE 4 MG: 4 INJECTION, SOLUTION INTRAMUSCULAR; INTRAVENOUS at 17:08

## 2023-07-31 RX ADMIN — ONDANSETRON 4 MG: 2 INJECTION INTRAMUSCULAR; INTRAVENOUS at 17:07

## 2023-07-31 RX ADMIN — DALBAVANCIN 1500 MG: 500 INJECTION, POWDER, FOR SOLUTION INTRAVENOUS at 19:56

## 2023-07-31 RX ADMIN — IOPAMIDOL 88 ML: 612 INJECTION, SOLUTION INTRAVENOUS at 17:26

## 2023-07-31 RX ADMIN — HYDROMORPHONE HYDROCHLORIDE 1 MG: 1 INJECTION, SOLUTION INTRAMUSCULAR; INTRAVENOUS; SUBCUTANEOUS at 19:57

## 2023-07-31 NOTE — ED PROVIDER NOTES
Subjective   History of Present Illness  Patient is a 58-year-old white male who comes to the ER today for left-sided facial swelling and pain.  Patient reports that he has noticed some light swelling for about the last month and states for the last few days the swelling got more significant.  Patient reports pain has increased at that time he has a tingling feeling in the left side of his face under the swollen area.  Patient also reports that he has pain trying to eat.  Patient denies any recent trauma.  Patient denies any recent skin injury.  Patient does report that he did have facial reconstruction surgery but its been over a decade ago.    Facial Pain    Review of Systems   Constitutional: Negative.    HENT: Negative.     Eyes: Negative.    Respiratory: Negative.     Cardiovascular: Negative.    Gastrointestinal: Negative.    Endocrine: Negative.    Genitourinary: Negative.    Musculoskeletal: Negative.    Skin: Negative.    Allergic/Immunologic: Negative.    Neurological: Negative.    Hematological: Negative.    Psychiatric/Behavioral: Negative.       Past Medical History:   Diagnosis Date    Elevated PSA        Allergies   Allergen Reactions    Codeine Nausea Only    Penicillins Hives       Past Surgical History:   Procedure Laterality Date    APPENDECTOMY      FACIAL RECONSTRUCTION SURGERY         Family History   Problem Relation Age of Onset    No Known Problems Father     No Known Problems Mother        Social History     Socioeconomic History    Marital status:    Tobacco Use    Smoking status: Every Day     Types: Cigarettes    Smokeless tobacco: Never   Substance and Sexual Activity    Alcohol use: No    Drug use: No           Objective   Physical Exam  Vitals and nursing note reviewed.   Constitutional:       Appearance: He is well-developed.   HENT:      Head: Normocephalic.      Right Ear: External ear normal.      Left Ear: External ear normal.   Eyes:      Conjunctiva/sclera: Conjunctivae  normal.      Pupils: Pupils are equal, round, and reactive to light.   Cardiovascular:      Rate and Rhythm: Normal rate and regular rhythm.      Heart sounds: Normal heart sounds.   Pulmonary:      Effort: Pulmonary effort is normal.      Breath sounds: Normal breath sounds.   Abdominal:      General: Bowel sounds are normal.      Palpations: Abdomen is soft.   Musculoskeletal:         General: Normal range of motion.      Cervical back: Normal range of motion and neck supple.   Skin:     General: Skin is warm and dry.      Capillary Refill: Capillary refill takes less than 2 seconds.      Comments: Left cheek area with moderate swelling, tendernss; fluctuance   Neurological:      Mental Status: He is alert and oriented to person, place, and time.   Psychiatric:         Behavior: Behavior normal.         Thought Content: Thought content normal.       Procedures           ED Course  ED Course as of 08/06/23 2040 Mon Jul 31, 2023 1848 Talked with  MDs. Advise they willl have physician call back [YOVANY]   1931 Discussed patient with Dr. Escalona, transfer physician and Dr. Melara with plastics. Advises that patient can follow up as outpatient and noted they wiil call patient with appt. Patient to be given Dalvance dose. Plastics was in agreeance with this.  [YOVANY]      ED Course User Index  [YOVANY] Abel Mccall, APRN                                           Medical Decision Making  Problems Addressed:  Facial abscess: complicated acute illness or injury    Amount and/or Complexity of Data Reviewed  Labs: ordered.  Radiology: ordered.    Risk  Prescription drug management.        Final diagnoses:   Facial abscess       ED Disposition  ED Disposition       ED Disposition   Discharge    Condition   Stable    Comment   --               Irma Frank MD  [address] [facility] [address]  [phone]    Schedule an appointment as soon as possible for a visit   For further evaluation         Medication List      No  changes were made to your prescriptions during this visit.            Abel Mccall, APRN  08/06/23 0253

## 2023-08-01 LAB — BACTERIA BLD CULT: ABNORMAL

## 2023-08-01 NOTE — DISCHARGE INSTRUCTIONS
[facility] will contact you with appointment time.     Return to the emergency room for worsening symptoms

## 2023-08-04 LAB
BACTERIA SPEC AEROBE CULT: ABNORMAL
GRAM STN SPEC: ABNORMAL
ISOLATED FROM: ABNORMAL

## 2024-07-15 ENCOUNTER — TRANSCRIBE ORDERS (OUTPATIENT)
Dept: ADMINISTRATIVE | Facility: HOSPITAL | Age: 59
End: 2024-07-15
Payer: COMMERCIAL

## 2024-07-17 ENCOUNTER — TRANSCRIBE ORDERS (OUTPATIENT)
Dept: ADMINISTRATIVE | Facility: HOSPITAL | Age: 59
End: 2024-07-17
Payer: COMMERCIAL

## 2024-07-17 DIAGNOSIS — L02.91 ABSCESS: Primary | ICD-10-CM

## 2024-07-31 ENCOUNTER — HOSPITAL ENCOUNTER (OUTPATIENT)
Facility: HOSPITAL | Age: 59
Discharge: HOME | End: 2024-07-31
Admitting: SURGERY
Payer: COMMERCIAL

## 2024-07-31 DIAGNOSIS — L02.91 ABSCESS: ICD-10-CM

## 2024-07-31 PROCEDURE — 70486 CT MAXILLOFACIAL W/O DYE: CPT

## 2024-07-31 PROCEDURE — 70486 CT MAXILLOFACIAL W/O DYE: CPT | Performed by: RADIOLOGY

## 2024-11-14 ENCOUNTER — OFFICE VISIT (OUTPATIENT)
Dept: UROLOGY | Facility: CLINIC | Age: 59
End: 2024-11-14
Payer: COMMERCIAL

## 2024-11-14 VITALS
DIASTOLIC BLOOD PRESSURE: 69 MMHG | HEIGHT: 66 IN | BODY MASS INDEX: 22.14 KG/M2 | WEIGHT: 137.8 LBS | HEART RATE: 67 BPM | SYSTOLIC BLOOD PRESSURE: 120 MMHG

## 2024-11-14 DIAGNOSIS — R97.20 ELEVATED PROSTATE SPECIFIC ANTIGEN (PSA): Primary | ICD-10-CM

## 2024-11-14 DIAGNOSIS — N13.8 ENLARGED PROSTATE WITH URINARY OBSTRUCTION: ICD-10-CM

## 2024-11-14 DIAGNOSIS — N40.1 ENLARGED PROSTATE WITH URINARY OBSTRUCTION: ICD-10-CM

## 2024-11-14 RX ORDER — TAMSULOSIN HYDROCHLORIDE 0.4 MG/1
1 CAPSULE ORAL DAILY
Qty: 90 CAPSULE | Refills: 3 | Status: SHIPPED | OUTPATIENT
Start: 2024-11-14

## 2024-11-14 RX ORDER — DIAZEPAM 10 MG/1
TABLET ORAL
Qty: 2 TABLET | Refills: 0 | Status: SHIPPED | OUTPATIENT
Start: 2024-11-14

## 2024-11-14 RX ORDER — CLINDAMYCIN HYDROCHLORIDE 300 MG/1
300 CAPSULE ORAL 2 TIMES DAILY
Qty: 6 CAPSULE | Refills: 0 | Status: SHIPPED | OUTPATIENT
Start: 2024-11-14 | End: 2024-11-17

## 2024-11-14 RX ORDER — CIPROFLOXACIN 500 MG/1
TABLET, FILM COATED ORAL
Qty: 4 TABLET | Refills: 0 | Status: SHIPPED | OUTPATIENT
Start: 2024-11-14

## 2024-11-14 NOTE — PROGRESS NOTES
"Chief Complaint:    Chief Complaint   Patient presents with    Elevated PSA     New patient       Vital Signs:   /69 (BP Location: Left arm, Patient Position: Sitting, Cuff Size: Adult)   Pulse 67   Ht 167.6 cm (65.98\")   Wt 62.5 kg (137 lb 12.8 oz)   BMI 22.25 kg/m²   Body mass index is 22.25 kg/m².      HPI:  Lui Medina is a 59 y.o. male who presents today for initial evaluation     History of Present Illness  Mr. Medina presents to the clinic to reestablish care.  He has a past medical history significant for enlarged prostate and elevated PSA.  He has been seen previously in 2019 and 2020 by Dr. Jacobsen.  He has had elevated PSAs for several years now.  He does have a notable family history of prostate cancer in his father and brother.  He had a PSA taken in 2019 that came back at 6.6 with a 21% free percentage.  He had a repeat PSA in 2020 that had decreased to 6.1 with a 28% free percentage.  He had a recent PSA taken by his primary care provider on 10/28/2024 that showed slightly increased to 7.3 with a 25% free percentage.  Currently has an IPSS score of 15.  He gets up 2 times throughout the night and endorses significant urgency, frequency, intermittency, and incomplete emptying.  He is not currently taking any medications for the prostate.  He did have a CT scan of the abdomen pelvis completed in May 2023 that did show significant prostatic enlargement however there was heterogenous aspects of the prostate concerning for possible underlying malignancy.  He is unable to do an MRI due to metal throughout his face and upper body.      Past Medical History:  Past Medical History:   Diagnosis Date    Elevated PSA        Current Meds:  Current Outpatient Medications   Medication Sig Dispense Refill    gabapentin (NEURONTIN) 600 MG tablet Take 1 tablet by mouth 3 (Three) Times a Day.      HYDROcodone-acetaminophen (NORCO) 7.5-325 MG per tablet Take 1 tablet by mouth Every 6 (Six) Hours As Needed " for Moderate Pain.      ciprofloxacin (Cipro) 500 MG tablet Take one tablet twice a day before procedure 4 tablet 0    clindamycin (CLEOCIN) 300 MG capsule Take 1 capsule by mouth 2 (Two) Times a Day for 3 days. Start after procedure 6 capsule 0    diazePAM (VALIUM) 10 MG tablet One tablet night before procedure at bedtime and one morning of one hour prior to procedure 2 tablet 0    tamsulosin (FLOMAX) 0.4 MG capsule 24 hr capsule Take 1 capsule by mouth Daily. 90 capsule 3     No current facility-administered medications for this visit.        Allergies:   Allergies   Allergen Reactions    Codeine Nausea Only    Penicillins Hives        Past Surgical History:  Past Surgical History:   Procedure Laterality Date    APPENDECTOMY      FACIAL RECONSTRUCTION SURGERY         Social History:  Social History     Socioeconomic History    Marital status:    Tobacco Use    Smoking status: Every Day     Types: Cigarettes    Smokeless tobacco: Never   Vaping Use    Vaping status: Never Used   Substance and Sexual Activity    Alcohol use: No    Drug use: No       Family History:  Family History   Problem Relation Age of Onset    No Known Problems Father     No Known Problems Mother        Review of Systems:  Review of Systems   Constitutional:  Negative for fatigue, fever and unexpected weight change.   Respiratory:  Positive for cough. Negative for chest tightness and shortness of breath.    Cardiovascular:  Negative for chest pain.   Gastrointestinal:  Positive for diarrhea. Negative for abdominal pain, constipation, nausea and vomiting.   Genitourinary:  Positive for difficulty urinating, frequency and urgency. Negative for dysuria.   Musculoskeletal:  Positive for back pain.   Skin:  Negative for rash.   Psychiatric/Behavioral:  Negative for confusion and suicidal ideas.        Physical Exam:  Physical Exam  Constitutional:       General: He is not in acute distress.     Comments: Unkept appearance   HENT:      Head:  Normocephalic and atraumatic.      Nose: Nose normal.      Mouth/Throat:      Mouth: Mucous membranes are moist.   Eyes:      Conjunctiva/sclera: Conjunctivae normal.   Cardiovascular:      Rate and Rhythm: Normal rate and regular rhythm.      Pulses: Normal pulses.      Heart sounds: Normal heart sounds.   Pulmonary:      Effort: Pulmonary effort is normal.      Breath sounds: Normal breath sounds.   Abdominal:      General: Bowel sounds are normal.      Palpations: Abdomen is soft.   Musculoskeletal:         General: Normal range of motion.      Cervical back: Normal range of motion.   Skin:     General: Skin is warm.   Neurological:      General: No focal deficit present.      Mental Status: He is alert and oriented to person, place, and time.   Psychiatric:         Mood and Affect: Mood normal.         Behavior: Behavior normal.         Thought Content: Thought content normal.         Judgment: Judgment normal.         IPSS Questionnaire (AUA-7):  IPSS Questionnaire (AUA-7):                  IPSS Questionnaire (AUA-7):  Over the past month…    1)  Incomplete Emptying  How often have you had a sensation of not emptying your bladder?  3 - About half the time   2)  Frequency  How often have you had to urinate less than every two hours? 1 - Less than 1 time in 5   3)  Intermittency  How often have you found you stopped and started again several times when you urinated?  5 - Almost always   4) Urgency  How often have you found it difficult to postpone urination?  2 - Less than half the time   5) Weak Stream  How often have you had a weak urinary stream?  1 - Less than 1 time in 5   6) Straining  How often have you had to push or strain to begin urination?  1 - Less than 1 time in 5   7) Nocturia  How many times did you typically get up at night to urinate?  2 - 2 times   Total Score:  15   The International Prostate Symptom Score (IPSS) is used to screen, diagnose, track symptoms of benign prostatic hyperplasia  (BPH).    0-7 pts (Mild Symptoms)  / 8-19 pts (Moderate) / 20-35 (Severe)    Quality of life due to urinary symptoms:  If you were to spend the rest of your life with your urinary condition the way it is now, how would you feel about that? 3-Mixed   Urine Leakage (Incontinence) 0-No Leakage       Recent Image (CT and/or KUB):   CT Abdomen and Pelvis: Results for orders placed during the hospital encounter of 05/12/23    CT Abdomen Pelvis With & Without Contrast    Narrative  EXAM: CT ABDOMEN PELVIS W WO CONTRAST-      TECHNIQUE: Multiple axial CT images were obtained from lung bases  through pubic symphysis WITHOUT AND THEN AFTER THE administration of IV  contrast. Reformatted images in the coronal and/or sagittal plane(s)  were generated from the axial data set to facilitate diagnostic accuracy  and/or surgical planning.  Oral Contrast:NONE.    Radiation dose reduction techniques were utilized per ALARA protocol.  Automated exposure control was initiated through either or [employer] or  [employer] software packages by  protocol.  DOSE: 1855.06 mGy.cm    Clinical information R10.84; R10.84-Generalized abdominal pain    Comparison none immediately available at this time    FINDINGS:    Lower thorax: Clear. No effusions.    Abdomen:    Liver: Homogeneous. No focal hepatic mass or ductal dilatation.    Gallbladder: No dilation or stone identified.    Pancreas: Unremarkable. No mass or ductal dilatation.    Spleen: Homogeneous. No splenomegaly.    Adrenals: No mass.    Kidneys/ureters: No mass. No obstructive uropathy.  No evidence of  urolithiasis.    GI tract: Moderate stool burden. There is no evidence of appendicitis    MESENTERY: No free fluid, walled off fluid collections, mesenteric  stranding, or enlarged lymph nodes      Vasculature: No evidence of aneurysm.    Abdominal wall: No focal hernia or mass.      Bladder: No focal mass or significant wall thickening    Reproductive: Prostate gland is  enlarged    Bones: No acute bony abnormality.    Impression  1. No definitive source for the patient's symptoms identified on today's  exam.      2. Other incidental findings as discussed above.                This report was finalized on 5/15/2023 11:24 AM by Dr. Brenton Griffith MD.     CT Stone Protocol: No results found for this or any previous visit.     KUB: No results found for this or any previous visit.       Labs:  Brief Urine Lab Results       None          No visits with results within 3 Month(s) from this visit.   Latest known visit with results is:   Admission on 07/31/2023, Discharged on 07/31/2023   Component Date Value Ref Range Status    Glucose 07/31/2023 101 (H)  65 - 99 mg/dL Final    BUN 07/31/2023 19  6 - 20 mg/dL Final    Creatinine 07/31/2023 0.92  0.76 - 1.27 mg/dL Final    Sodium 07/31/2023 137  136 - 145 mmol/L Final    Potassium 07/31/2023 4.6  3.5 - 5.2 mmol/L Final    Slight hemolysis detected by analyzer. Results may be affected.    Chloride 07/31/2023 101  98 - 107 mmol/L Final    CO2 07/31/2023 25.1  22.0 - 29.0 mmol/L Final    Calcium 07/31/2023 9.3  8.6 - 10.5 mg/dL Final    Total Protein 07/31/2023 7.7  6.0 - 8.5 g/dL Final    Albumin 07/31/2023 4.1  3.5 - 5.2 g/dL Final    ALT (SGPT) 07/31/2023 11  1 - 41 U/L Final    AST (SGOT) 07/31/2023 20  1 - 40 U/L Final    Alkaline Phosphatase 07/31/2023 100  39 - 117 U/L Final    Total Bilirubin 07/31/2023 0.3  0.0 - 1.2 mg/dL Final    Globulin 07/31/2023 3.6  gm/dL Final    A/G Ratio 07/31/2023 1.1  g/dL Final    BUN/Creatinine Ratio 07/31/2023 20.7  7.0 - 25.0 Final    Anion Gap 07/31/2023 10.9  5.0 - 15.0 mmol/L Final    eGFR 07/31/2023 97.0  >60.0 mL/min/1.73 Final    C-Reactive Protein 07/31/2023 2.18 (H)  0.00 - 0.50 mg/dL Final    Procalcitonin 07/31/2023 <0.02  0.00 - 0.25 ng/mL Final    Blood Culture 07/31/2023 Acinetobacter lwoffii (C)   Final    Isolated from 07/31/2023 Aerobic Bottle   Final    Gram Stain 07/31/2023 Aerobic  Bottle Gram positive cocci in pairs (C)   Final    Gram Stain 07/31/2023 Aerobic Bottle Gram negative cocci (C)   Final    Blood Culture 07/31/2023 Acinetobacter lwoffii (C)   Final    Isolated from 07/31/2023 Aerobic Bottle   Final    Blood Culture 07/31/2023 Staphylococcus, coagulase negative (C)   Final    Isolated from 07/31/2023 Aerobic Bottle   Final    Gram Stain 07/31/2023 Aerobic Bottle Gram positive cocci in pairs (C)   Final    Gram Stain 07/31/2023 Aerobic Bottle Gram negative cocci (C)   Final    Lactate 07/31/2023 1.1  0.5 - 2.0 mmol/L Final    WBC 07/31/2023 10.44  3.40 - 10.80 10*3/mm3 Final    RBC 07/31/2023 5.42  4.14 - 5.80 10*6/mm3 Final    Hemoglobin 07/31/2023 16.1  13.0 - 17.7 g/dL Final    Hematocrit 07/31/2023 49.0  37.5 - 51.0 % Final    MCV 07/31/2023 90.4  79.0 - 97.0 fL Final    MCH 07/31/2023 29.7  26.6 - 33.0 pg Final    MCHC 07/31/2023 32.9  31.5 - 35.7 g/dL Final    RDW 07/31/2023 13.1  12.3 - 15.4 % Final    RDW-SD 07/31/2023 43.1  37.0 - 54.0 fl Final    MPV 07/31/2023 8.6  6.0 - 12.0 fL Final    Platelets 07/31/2023 307  140 - 450 10*3/mm3 Final    Neutrophil % 07/31/2023 69.0  42.7 - 76.0 % Final    Lymphocyte % 07/31/2023 22.6  19.6 - 45.3 % Final    Monocyte % 07/31/2023 6.9  5.0 - 12.0 % Final    Eosinophil % 07/31/2023 0.7  0.3 - 6.2 % Final    Basophil % 07/31/2023 0.4  0.0 - 1.5 % Final    Immature Grans % 07/31/2023 0.4  0.0 - 0.5 % Final    Neutrophils, Absolute 07/31/2023 7.21 (H)  1.70 - 7.00 10*3/mm3 Final    Lymphocytes, Absolute 07/31/2023 2.36  0.70 - 3.10 10*3/mm3 Final    Monocytes, Absolute 07/31/2023 0.72  0.10 - 0.90 10*3/mm3 Final    Eosinophils, Absolute 07/31/2023 0.07  0.00 - 0.40 10*3/mm3 Final    Basophils, Absolute 07/31/2023 0.04  0.00 - 0.20 10*3/mm3 Final    Immature Grans, Absolute 07/31/2023 0.04  0.00 - 0.05 10*3/mm3 Final    nRBC 07/31/2023 0.0  0.0 - 0.2 /100 WBC Final    BCID, PCR 07/31/2023 Staph spp, not aureus or lugdunensis. Identification  by BCID2 PCR. (A)  Negative by BCID PCR. Culture to Follow. Final        Procedure: None  Procedures     Detrusor instability-patient has been diagnosed with detrusor instability which is an irritative bladder symptomatology most likely related to factors such as intake of bladder irritants, postinfectious irritation, prolapse, with a very large differential diagnosis. The mainstay of treatment has been tight cholinergics which basically cause the bladder to have decreased contractility. We have discussed the side effects of these treatments including dry mouth, double vision, and increasing constipation.     Assessment/Plan:   Problem List Items Addressed This Visit       Elevated prostate specific antigen (PSA) - Primary    Relevant Medications    tamsulosin (FLOMAX) 0.4 MG capsule 24 hr capsule    clindamycin (CLEOCIN) 300 MG capsule    ciprofloxacin (Cipro) 500 MG tablet    diazePAM (VALIUM) 10 MG tablet    Enlarged prostate with urinary obstruction    Relevant Medications    tamsulosin (FLOMAX) 0.4 MG capsule 24 hr capsule       Health Maintenance:   Health Maintenance Due   Topic Date Due    COLORECTAL CANCER SCREENING  Never done    Pneumococcal Vaccine 0-64 (1 of 2 - PCV) Never done    TDAP/TD VACCINES (1 - Tdap) Never done    ZOSTER VACCINE (1 of 2) Never done    HEPATITIS C SCREENING  Never done    ANNUAL PHYSICAL  Never done    INFLUENZA VACCINE  Never done    COVID-19 Vaccine (3 - 2024-25 season) 09/01/2024        Smoking Counseling: Everyday smoker.  Never used smokeless tobacco.    Urine Incontinence: Patient reports that he is not currently experiencing any symptoms of urinary incontinence.    Patient was given instructions and counseling regarding his condition or for health maintenance advice. Please see specific information pulled into the AVS if appropriate.    Patient Education:   Enlarged prostate: Discussed the pathophysiology of enlarged prostate and obstruction.  We discussed the static and  dynamic effects of enlarged prostate as well as using 5 alpha reductase inhibitors versus alpha blockade.  We discussed the indications for transurethral surgery as well and/ or other therapeutic options available including all of the newer techniques.  Given patient's currently urinary tract symptoms we will start him on Flomax 0.4 mg once nightly.  Discussed the risk and benefits of alpha blockade including side effects such as lightheadedness, dizziness, blurry vision, double vision, chest pain, shortness of breath, and syncope.  Educated to discontinue medications if any these occur.  He verbalized understanding.  PSA testing -patient's most recent PSA remains elevated at 7.3 with a high free percentage of 28.  Advised him this is a low risk for cancer however patient does have a significant family history of cancer in his brother and father..  I discussed the pathophysiology of PSA testing indicating its use in the diagnosis and management of prostate cancer.  I discussed the normal range being 0 to 4, but more appropriately being much closer to 0 to 2 in a normal male.  I discussed the fact that after a certain age it is against recommendation to use PSA testing especially in view of numerous comorbidities.  I discussed many of the things that can artificially raise PSA including put not limited to a recent infection, urinary tract infection, recent sexual intercourse, or even the type of movement such as manipulation of the prostate from riding a bicycle.  It was discussed that the most important use of PSA is the velocity measurement.  This refers to the change of PSA with time. I discussed that we look for greater than 20% rise over a year to help us make the prediction of prostate cancer.  I also discussed that in the case of prostate cancer indicating a radical prostatectomy, the PSA should be 0 and any rise indicates an early biochemical recurrence.  Patient is unable to undergo an MRI.  Did discuss the  use of a biopsy in office given persistent elevated PSAs, family history, and heterogenicity on CT scan.  Discussed the nature of this procedure in detail.  Discussed the use of antibiotics and enema prior to procedure for infection prophylaxis.  Also discussed the use of Valium for preop use.  Patient does wish to proceed forward with an office biopsy and we will get him scheduled appropriately.    Visit Diagnoses:    ICD-10-CM ICD-9-CM   1. Elevated prostate specific antigen (PSA)  R97.20 790.93   2. Enlarged prostate with urinary obstruction  N40.1 600.01    N13.8 599.69     A total of 30 minutes were spent coordinating this patient’s care in clinic today; 15 minutes of which were face-to-face with the patient, reviewing medical history and counseling on the current treatment and followup plan.  All questions were answered to patient's satisfaction.    Meds Ordered During Visit:  New Medications Ordered This Visit   Medications    tamsulosin (FLOMAX) 0.4 MG capsule 24 hr capsule     Sig: Take 1 capsule by mouth Daily.     Dispense:  90 capsule     Refill:  3    clindamycin (CLEOCIN) 300 MG capsule     Sig: Take 1 capsule by mouth 2 (Two) Times a Day for 3 days. Start after procedure     Dispense:  6 capsule     Refill:  0    ciprofloxacin (Cipro) 500 MG tablet     Sig: Take one tablet twice a day before procedure     Dispense:  4 tablet     Refill:  0    diazePAM (VALIUM) 10 MG tablet     Sig: One tablet night before procedure at bedtime and one morning of one hour prior to procedure     Dispense:  2 tablet     Refill:  0       Follow Up Appointment: Biopsy  No follow-ups on file.      This document has been electronically signed by Phill Felipe PA-C   November 14, 2024 09:20 EST    Part of this note may be an electronic transcription/translation of spoken language to printed text using the Dragon Dictation System.

## 2025-01-21 ENCOUNTER — PROCEDURE VISIT (OUTPATIENT)
Dept: UROLOGY | Facility: CLINIC | Age: 60
End: 2025-01-21
Payer: COMMERCIAL

## 2025-01-21 ENCOUNTER — TELEPHONE (OUTPATIENT)
Dept: GASTROENTEROLOGY | Facility: CLINIC | Age: 60
End: 2025-01-21
Payer: COMMERCIAL

## 2025-01-21 VITALS
HEIGHT: 66 IN | HEART RATE: 59 BPM | DIASTOLIC BLOOD PRESSURE: 62 MMHG | BODY MASS INDEX: 23.37 KG/M2 | WEIGHT: 145.4 LBS | SYSTOLIC BLOOD PRESSURE: 105 MMHG

## 2025-01-21 DIAGNOSIS — N40.1 ENLARGED PROSTATE WITH URINARY OBSTRUCTION: ICD-10-CM

## 2025-01-21 DIAGNOSIS — Z48.816 AFTERCARE FOLLOWING SURGERY OF THE GENITOURINARY SYSTEM: ICD-10-CM

## 2025-01-21 DIAGNOSIS — R97.20 ELEVATED PROSTATE SPECIFIC ANTIGEN (PSA): Primary | ICD-10-CM

## 2025-01-21 DIAGNOSIS — N13.8 ENLARGED PROSTATE WITH URINARY OBSTRUCTION: ICD-10-CM

## 2025-01-21 RX ORDER — GENTAMICIN 40 MG/ML
80 INJECTION, SOLUTION INTRAMUSCULAR; INTRAVENOUS ONCE
Status: COMPLETED | OUTPATIENT
Start: 2025-01-21 | End: 2025-01-21

## 2025-01-21 RX ORDER — MELATONIN 12 MG
133 TABLET,DISINTEGRATING ORAL ONCE
Qty: 133 ML | Refills: 0 | Status: SHIPPED | OUTPATIENT
Start: 2025-01-21 | End: 2025-01-21

## 2025-01-21 RX ADMIN — GENTAMICIN 80 MG: 40 INJECTION, SOLUTION INTRAMUSCULAR; INTRAVENOUS at 15:59

## 2025-01-21 NOTE — PROGRESS NOTES
Chief Complaint:      Chief Complaint   Patient presents with    Prostate Biopsy       HPI:   59 y.o. male here for biopsy.    Past Medical History:     Past Medical History:   Diagnosis Date    Elevated PSA        Current Meds:     Current Outpatient Medications   Medication Sig Dispense Refill    ciprofloxacin (Cipro) 500 MG tablet Take one tablet twice a day before procedure 4 tablet 0    diazePAM (VALIUM) 10 MG tablet One tablet night before procedure at bedtime and one morning of one hour prior to procedure 2 tablet 0    gabapentin (NEURONTIN) 600 MG tablet Take 1 tablet by mouth 3 (Three) Times a Day.      HYDROcodone-acetaminophen (NORCO) 7.5-325 MG per tablet Take 1 tablet by mouth Every 6 (Six) Hours As Needed for Moderate Pain.      Sodium Phosphates ([facility] Enema Ready-to-Use) 7-19 GM/118ML enema Insert 133 mL into the rectum 1 time for 1 dose. 133 mL 0    tamsulosin (FLOMAX) 0.4 MG capsule 24 hr capsule Take 1 capsule by mouth Daily. 90 capsule 3     No current facility-administered medications for this visit.        Allergies:      Allergies   Allergen Reactions    Codeine Nausea Only    Penicillins Hives        Past Surgical History:     Past Surgical History:   Procedure Laterality Date    APPENDECTOMY      FACIAL RECONSTRUCTION SURGERY         Social History:     Social History     Socioeconomic History    Marital status:    Tobacco Use    Smoking status: Every Day     Types: Cigarettes    Smokeless tobacco: Never   Vaping Use    Vaping status: Never Used   Substance and Sexual Activity    Alcohol use: No    Drug use: No       Family History:     Family History   Problem Relation Age of Onset    No Known Problems Father     No Known Problems Mother        Review of Systems:     Review of Systems   Constitutional: Negative.    HENT: Negative.     Eyes: Negative.    Respiratory: Negative.     Cardiovascular: Negative.    Gastrointestinal: Negative.    Endocrine: Negative.    Musculoskeletal:  Negative.    Allergic/Immunologic: Negative.    Neurological: Negative.    Hematological: Negative.    Psychiatric/Behavioral: Negative.         Physical Exam:     Physical Exam  Vitals and nursing note reviewed.   Constitutional:       Appearance: He is well-developed.   HENT:      Head: Normocephalic and atraumatic.   Eyes:      Conjunctiva/sclera: Conjunctivae normal.      Pupils: Pupils are equal, round, and reactive to light.   Cardiovascular:      Rate and Rhythm: Normal rate and regular rhythm.      Heart sounds: Normal heart sounds.   Pulmonary:      Effort: Pulmonary effort is normal.      Breath sounds: Normal breath sounds.   Abdominal:      General: Bowel sounds are normal.      Palpations: Abdomen is soft.   Musculoskeletal:         General: Normal range of motion.      Cervical back: Normal range of motion.   Skin:     General: Skin is warm and dry.   Neurological:      Mental Status: He is alert and oriented to person, place, and time.      Deep Tendon Reflexes: Reflexes are normal and symmetric.   Psychiatric:         Behavior: Behavior normal.         Thought Content: Thought content normal.         Judgment: Judgment normal.         I have reviewed the following portions of the patient's history: Allergies, current medications, past family history, past medical history, past social history, past surgical history, problem list, and ROS and confirm it is accurate.    Recent Image (CT and/or KUB):      CT Abdomen and Pelvis: Results for orders placed during the hospital encounter of 05/12/23    CT Abdomen Pelvis With & Without Contrast    Narrative  EXAM: CT ABDOMEN PELVIS W WO CONTRAST-      TECHNIQUE: Multiple axial CT images were obtained from lung bases  through pubic symphysis WITHOUT AND THEN AFTER THE administration of IV  contrast. Reformatted images in the coronal and/or sagittal plane(s)  were generated from the axial data set to facilitate diagnostic accuracy  and/or surgical  planning.  Oral Contrast:NONE.    Radiation dose reduction techniques were utilized per ALARA protocol.  Automated exposure control was initiated through either or [employer] or  [employer] software packages by  protocol.  DOSE: 1855.06 mGy.cm    Clinical information R10.84; R10.84-Generalized abdominal pain    Comparison none immediately available at this time    FINDINGS:    Lower thorax: Clear. No effusions.    Abdomen:    Liver: Homogeneous. No focal hepatic mass or ductal dilatation.    Gallbladder: No dilation or stone identified.    Pancreas: Unremarkable. No mass or ductal dilatation.    Spleen: Homogeneous. No splenomegaly.    Adrenals: No mass.    Kidneys/ureters: No mass. No obstructive uropathy.  No evidence of  urolithiasis.    GI tract: Moderate stool burden. There is no evidence of appendicitis    MESENTERY: No free fluid, walled off fluid collections, mesenteric  stranding, or enlarged lymph nodes      Vasculature: No evidence of aneurysm.    Abdominal wall: No focal hernia or mass.      Bladder: No focal mass or significant wall thickening    Reproductive: Prostate gland is enlarged    Bones: No acute bony abnormality.    Impression  1. No definitive source for the patient's symptoms identified on today's  exam.      2. Other incidental findings as discussed above.                This report was finalized on 5/15/2023 11:24 AM by Dr. Brenton Griffith MD.       CT Stone Protocol: No results found for this or any previous visit.       KUB: No results found for this or any previous visit.       Labs (past 3 months):      No visits with results within 3 Month(s) from this visit.   Latest known visit with results is:   Admission on 07/31/2023, Discharged on 07/31/2023   Component Date Value Ref Range Status    Glucose 07/31/2023 101 (H)  65 - 99 mg/dL Final    BUN 07/31/2023 19  6 - 20 mg/dL Final    Creatinine 07/31/2023 0.92  0.76 - 1.27 mg/dL Final    Sodium 07/31/2023 137  136 - 145 mmol/L  Final    Potassium 07/31/2023 4.6  3.5 - 5.2 mmol/L Final    Slight hemolysis detected by analyzer. Results may be affected.    Chloride 07/31/2023 101  98 - 107 mmol/L Final    CO2 07/31/2023 25.1  22.0 - 29.0 mmol/L Final    Calcium 07/31/2023 9.3  8.6 - 10.5 mg/dL Final    Total Protein 07/31/2023 7.7  6.0 - 8.5 g/dL Final    Albumin 07/31/2023 4.1  3.5 - 5.2 g/dL Final    ALT (SGPT) 07/31/2023 11  1 - 41 U/L Final    AST (SGOT) 07/31/2023 20  1 - 40 U/L Final    Alkaline Phosphatase 07/31/2023 100  39 - 117 U/L Final    Total Bilirubin 07/31/2023 0.3  0.0 - 1.2 mg/dL Final    Globulin 07/31/2023 3.6  gm/dL Final    A/G Ratio 07/31/2023 1.1  g/dL Final    BUN/Creatinine Ratio 07/31/2023 20.7  7.0 - 25.0 Final    Anion Gap 07/31/2023 10.9  5.0 - 15.0 mmol/L Final    eGFR 07/31/2023 97.0  >60.0 mL/min/1.73 Final    C-Reactive Protein 07/31/2023 2.18 (H)  0.00 - 0.50 mg/dL Final    Procalcitonin 07/31/2023 <0.02  0.00 - 0.25 ng/mL Final    Blood Culture 07/31/2023 Acinetobacter lwoffii (C)   Final    Isolated from 07/31/2023 Aerobic Bottle   Final    Gram Stain 07/31/2023 Aerobic Bottle Gram positive cocci in pairs (C)   Final    Gram Stain 07/31/2023 Aerobic Bottle Gram negative cocci (C)   Final    Blood Culture 07/31/2023 Acinetobacter lwoffii (C)   Final    Isolated from 07/31/2023 Aerobic Bottle   Final    Blood Culture 07/31/2023 Staphylococcus, coagulase negative (C)   Final    Isolated from 07/31/2023 Aerobic Bottle   Final    Gram Stain 07/31/2023 Aerobic Bottle Gram positive cocci in pairs (C)   Final    Gram Stain 07/31/2023 Aerobic Bottle Gram negative cocci (C)   Final    Lactate 07/31/2023 1.1  0.5 - 2.0 mmol/L Final    WBC 07/31/2023 10.44  3.40 - 10.80 10*3/mm3 Final    RBC 07/31/2023 5.42  4.14 - 5.80 10*6/mm3 Final    Hemoglobin 07/31/2023 16.1  13.0 - 17.7 g/dL Final    Hematocrit 07/31/2023 49.0  37.5 - 51.0 % Final    MCV 07/31/2023 90.4  79.0 - 97.0 fL Final    MCH 07/31/2023 29.7  26.6 - 33.0  pg Final    MCHC 07/31/2023 32.9  31.5 - 35.7 g/dL Final    RDW 07/31/2023 13.1  12.3 - 15.4 % Final    RDW-SD 07/31/2023 43.1  37.0 - 54.0 fl Final    MPV 07/31/2023 8.6  6.0 - 12.0 fL Final    Platelets 07/31/2023 307  140 - 450 10*3/mm3 Final    Neutrophil % 07/31/2023 69.0  42.7 - 76.0 % Final    Lymphocyte % 07/31/2023 22.6  19.6 - 45.3 % Final    Monocyte % 07/31/2023 6.9  5.0 - 12.0 % Final    Eosinophil % 07/31/2023 0.7  0.3 - 6.2 % Final    Basophil % 07/31/2023 0.4  0.0 - 1.5 % Final    Immature Grans % 07/31/2023 0.4  0.0 - 0.5 % Final    Neutrophils, Absolute 07/31/2023 7.21 (H)  1.70 - 7.00 10*3/mm3 Final    Lymphocytes, Absolute 07/31/2023 2.36  0.70 - 3.10 10*3/mm3 Final    Monocytes, Absolute 07/31/2023 0.72  0.10 - 0.90 10*3/mm3 Final    Eosinophils, Absolute 07/31/2023 0.07  0.00 - 0.40 10*3/mm3 Final    Basophils, Absolute 07/31/2023 0.04  0.00 - 0.20 10*3/mm3 Final    Immature Grans, Absolute 07/31/2023 0.04  0.00 - 0.05 10*3/mm3 Final    nRBC 07/31/2023 0.0  0.0 - 0.2 /100 WBC Final    BCID, PCR 07/31/2023 Staph spp, not aureus or lugdunensis. Identification by BCID2 PCR. (A)  Negative by BCID PCR. Culture to Follow. Final        Procedure:     Prostate ultrasound and biopsy:  59 year-old white male with a history of an elevated PSA and a significant increase in his risk for prostate cancer.  We discussed the prostate biopsy at length.  We discussed the significant risks of anesthesia, bleeding, infection, urosepsis, purported effects on erectile function, and rectal bleeding requiring surgical intervention.  He was given a pre-procedural enema.  He was pretreated with ciprofloxacin for 3 days.  He was given periprocedural gentamicin at the dose of 80 mg in an intramuscular fashion and given post biopsy clindamycin for 3 days.  Following an extensive informed consent, he was brought to the operative suite, placed in the left lateral decubitus position.  He was given his prophylactic gentamicin.   The rectum was anesthetized with 20 mL of 2% viscous Xylocaine jelly.  I then used the BK biplanar probe inserted into the rectum and made measurements of the prostate.  The height was 5.15 cm, the width was 6.29 cm, and the length was 6.25 cm for a volume of 105.27 g.  There were no obvious hypoechoic areas.  There was no intravesical median lobe.  There was minimal calcification between the transition and peripheral zone.  I then injected 20 mL of 1% Xylocaine in the perirectal area and raised of skin wheal to provide anesthesia after an adequate period of topical anesthesia. I used the Biopty gun to take a total of 10 cores without complication.  He tolerated this extremely well.  Cores.  There was no bleeding or complications.   Impression: Elevated PSA s/p biopsy.  Assessment/Plan:   Elevated prostate specific antigen-we discussed the diagnosis of elevated prostate-specific antigen.  I explained the pathophysiology of PSA.  It is a serine protease that's function in the male reproductive tract is to facilitate the liquefaction of semen.  It is for this reason the body does not want it freely floating in the serum and why typically bound tightly to albumin.  We discussed why we used both a PSA free and total to determine the need for more aggressive therapy. I discussed the normal range.  Additionally, it was in the range of 1 to 4, but more recently has been downgraded to something less than 2 or even approaching 1.  I discussed the risk of family history, particularly the fact that the average male has a 14% risk of prostate cancer and that in the face of a positive diagnosis in a father it will tablet and any other first-generation relative continued tablet insofar that a father and brother with prostate cancer will produce almost a 50% risk of prostate cancer.  I discussed the use of the temporal use of PSA as the best option for monitoring.  We also discussed the fact that an elevated PSA is an isolated  event does not mean that this is prostate cancer and should not engender worry in this regard. I discussed other things that can elevate PSA including constipation, prostatitis, infection, recent intercourse etc., as well as the risks and benefits associated with this.  Also discussed the fact that this is with a dilutional test and as a consequence of such were present produce slight variations on a single specimen.  Further discussed the risks and benefits of a prostate biopsy as well.            This document has been electronically signed by LUPE RAE MD January 21, 2025 13:32 EST    Dictated Utilizing Dragon Dictation: Part of this note may be an electronic transcription/translation of spoken language to printed text using the Dragon Dictation System.

## 2025-01-22 ENCOUNTER — TELEPHONE (OUTPATIENT)
Dept: UROLOGY | Facility: CLINIC | Age: 60
End: 2025-01-22
Payer: COMMERCIAL

## 2025-01-22 LAB — REF LAB TEST METHOD: NORMAL

## 2025-01-22 NOTE — TELEPHONE ENCOUNTER
The pt called and spoke to Gela and was needing his Enema sent in it was sent yesterday but a lot of the times insurance will not cover an enema so they dont tell the pt it was even sent in. He can pick one up over the counter.

## 2025-01-28 ENCOUNTER — OFFICE VISIT (OUTPATIENT)
Dept: UROLOGY | Facility: CLINIC | Age: 60
End: 2025-01-28
Payer: COMMERCIAL

## 2025-01-28 VITALS
HEART RATE: 66 BPM | DIASTOLIC BLOOD PRESSURE: 73 MMHG | BODY MASS INDEX: 23.3 KG/M2 | WEIGHT: 145 LBS | SYSTOLIC BLOOD PRESSURE: 123 MMHG | HEIGHT: 66 IN

## 2025-01-28 DIAGNOSIS — N40.1 ENLARGED PROSTATE WITH URINARY OBSTRUCTION: Primary | ICD-10-CM

## 2025-01-28 DIAGNOSIS — N13.8 ENLARGED PROSTATE WITH URINARY OBSTRUCTION: Primary | ICD-10-CM

## 2025-01-28 DIAGNOSIS — R97.20 ELEVATED PROSTATE SPECIFIC ANTIGEN (PSA): ICD-10-CM

## 2025-01-28 PROCEDURE — 99213 OFFICE O/P EST LOW 20 MIN: CPT | Performed by: UROLOGY

## 2025-01-28 PROCEDURE — 1160F RVW MEDS BY RX/DR IN RCRD: CPT | Performed by: UROLOGY

## 2025-01-28 PROCEDURE — 1159F MED LIST DOCD IN RCRD: CPT | Performed by: UROLOGY

## 2025-01-28 RX ORDER — ASPIRIN 81 MG
81 TABLET,CHEWABLE ORAL
COMMUNITY
Start: 2025-01-21

## 2025-01-28 RX ORDER — SILDENAFIL 100 MG/1
100 TABLET, FILM COATED ORAL DAILY PRN
COMMUNITY
Start: 2025-01-20

## 2025-01-28 RX ORDER — FINASTERIDE 5 MG/1
5 TABLET, FILM COATED ORAL DAILY
Qty: 30 TABLET | Refills: 5 | Status: SHIPPED | OUTPATIENT
Start: 2025-01-28

## 2025-01-28 NOTE — PROGRESS NOTES
Chief Complaint:      Chief Complaint   Patient presents with    Biopsy Results       HPI:   Mr. Medina presents to the clinic to reestablish care. He has a past medical history significant for enlarged prostate and elevated PSA. He has been seen previously in 2019 and 2020 by Dr. Jacobsen. He has had elevated PSAs for several years now. He does have a notable family history of prostate cancer in his father and brother. He had a PSA taken in 2019 that came back at 6.6 with a 21% free percentage. He had a repeat PSA in 2020 that had decreased to 6.1 with a 28% free percentage. He had a recent PSA taken by his primary care provider on 10/28/2024 that showed slightly increased to 7.3 with a 25% free percentage. Currently has an IPSS score of 15. He gets up 2 times throughout the night and endorses significant urgency, frequency, intermittency, and incomplete emptying. He is not currently taking any medications for the prostate. He did have a CT scan of the abdomen pelvis completed in May 2023 that did show significant prostatic enlargement however there was heterogenous aspects of the prostate concerning for possible underlying malignancy. He is unable to do an MRI due to metal throughout his face.  He returns today his biopsy was negative he has a strong positive family history I am going to add finasteride I am to watch him closely I will see him back in 3 months.  C still at very high risk for prostate cancer    Past Medical History:     Past Medical History:   Diagnosis Date    Elevated PSA        Current Meds:     Current Outpatient Medications   Medication Sig Dispense Refill    Aspirin Low Dose 81 MG chewable tablet 1 tablet.      gabapentin (NEURONTIN) 600 MG tablet Take 1 tablet by mouth 3 (Three) Times a Day.      HYDROcodone-acetaminophen (NORCO) 7.5-325 MG per tablet Take 1 tablet by mouth Every 6 (Six) Hours As Needed for Moderate Pain.      omeprazole (priLOSEC) 20 MG capsule Take 1 capsule by mouth Daily.       sildenafil (VIAGRA) 100 MG tablet Take 1 tablet by mouth Daily As Needed.      tamsulosin (FLOMAX) 0.4 MG capsule 24 hr capsule Take 1 capsule by mouth Daily. 90 capsule 3    ciprofloxacin (Cipro) 500 MG tablet Take one tablet twice a day before procedure (Patient not taking: Reported on 1/28/2025) 4 tablet 0    diazePAM (VALIUM) 10 MG tablet One tablet night before procedure at bedtime and one morning of one hour prior to procedure (Patient not taking: Reported on 1/28/2025) 2 tablet 0     No current facility-administered medications for this visit.        Allergies:      Allergies   Allergen Reactions    Codeine Nausea Only    Penicillins Hives        Past Surgical History:     Past Surgical History:   Procedure Laterality Date    APPENDECTOMY      FACIAL RECONSTRUCTION SURGERY         Social History:     Social History     Socioeconomic History    Marital status:    Tobacco Use    Smoking status: Every Day     Types: Cigarettes    Smokeless tobacco: Never   Vaping Use    Vaping status: Never Used   Substance and Sexual Activity    Alcohol use: No    Drug use: No    Sexual activity: Never       Family History:     Family History   Problem Relation Age of Onset    No Known Problems Father     No Known Problems Mother        Review of Systems:     Review of Systems   Constitutional: Negative.    HENT: Negative.     Eyes: Negative.    Respiratory: Negative.     Cardiovascular: Negative.    Gastrointestinal: Negative.    Endocrine: Negative.    Musculoskeletal: Negative.    Allergic/Immunologic: Negative.    Neurological: Negative.    Hematological: Negative.    Psychiatric/Behavioral: Negative.         Physical Exam:     Physical Exam  Vitals and nursing note reviewed.   Constitutional:       Appearance: He is well-developed.   HENT:      Head: Normocephalic and atraumatic.   Eyes:      Conjunctiva/sclera: Conjunctivae normal.      Pupils: Pupils are equal, round, and reactive to light.   Cardiovascular:       Rate and Rhythm: Normal rate and regular rhythm.      Heart sounds: Normal heart sounds.   Pulmonary:      Effort: Pulmonary effort is normal.      Breath sounds: Normal breath sounds.   Abdominal:      General: Bowel sounds are normal.      Palpations: Abdomen is soft.   Musculoskeletal:         General: Normal range of motion.      Cervical back: Normal range of motion.   Skin:     General: Skin is warm and dry.   Neurological:      Mental Status: He is alert and oriented to person, place, and time.      Deep Tendon Reflexes: Reflexes are normal and symmetric.   Psychiatric:         Behavior: Behavior normal.         Thought Content: Thought content normal.         Judgment: Judgment normal.         I have reviewed the following portions of the patient's history: Allergies, current medications, past family history, past medical history, past social history, past surgical history, problem list, and ROS and confirm it is accurate.    Recent Image (CT and/or KUB):      CT Abdomen and Pelvis: Results for orders placed during the hospital encounter of 05/12/23    CT Abdomen Pelvis With & Without Contrast    Narrative  EXAM: CT ABDOMEN PELVIS W WO CONTRAST-      TECHNIQUE: Multiple axial CT images were obtained from lung bases  through pubic symphysis WITHOUT AND THEN AFTER THE administration of IV  contrast. Reformatted images in the coronal and/or sagittal plane(s)  were generated from the axial data set to facilitate diagnostic accuracy  and/or surgical planning.  Oral Contrast:NONE.    Radiation dose reduction techniques were utilized per ALARA protocol.  Automated exposure control was initiated through either or CareDose or  DoseRigTasspass software packages by  protocol.  DOSE: 1855.06 mGy.cm    Clinical information R10.84; R10.84-Generalized abdominal pain    Comparison none immediately available at this time    FINDINGS:    Lower thorax: Clear. No effusions.    Abdomen:    Liver: Homogeneous. No focal  hepatic mass or ductal dilatation.    Gallbladder: No dilation or stone identified.    Pancreas: Unremarkable. No mass or ductal dilatation.    Spleen: Homogeneous. No splenomegaly.    Adrenals: No mass.    Kidneys/ureters: No mass. No obstructive uropathy.  No evidence of  urolithiasis.    GI tract: Moderate stool burden. There is no evidence of appendicitis    MESENTERY: No free fluid, walled off fluid collections, mesenteric  stranding, or enlarged lymph nodes      Vasculature: No evidence of aneurysm.    Abdominal wall: No focal hernia or mass.      Bladder: No focal mass or significant wall thickening    Reproductive: Prostate gland is enlarged    Bones: No acute bony abnormality.    Impression  1. No definitive source for the patient's symptoms identified on today's  exam.      2. Other incidental findings as discussed above.                This report was finalized on 5/15/2023 11:24 AM by Dr. Brenton Griffith MD.       CT Stone Protocol: No results found for this or any previous visit.       KUB: No results found for this or any previous visit.       Labs (past 3 months):      Procedure visit on 2025   Component Date Value Ref Range Status    Reference Lab Report 2025    Final                    Value:[facility]  [address] [place] [address]  Phone: [phone] or [phone]  Fax: [phone]  Dennis Barcenas M.D., Medical Director    PATIENT NAME                                     LABORATORY NO.  [id number]   GAIL, MASON JAIN                                [id number]  [id number]                                 AGE                    SEX   N              CLIENT REF #  [facility]                             59        1965           [id number]      [id number]    [facility]                 REQUESTING M.D.           ATTENDING M.D.         COPY TO.  HEMANTH RAE,  [address] [facility]          "         [place] [address]                           DATE COLLECTED            DATE RECEIVED          DATE REPORTED  01/21/2025 01/21/2025 01/22/2025    DIAGNOSIS:  A.     PROSTATE, NEEDLE CORE BIOPSY, RIGHT MID:  Benign prostatic tissue  B.                               PROSTATE, NEEDLE CORE BIOPSY, RIGHT BASE:  Benign prostatic tissue  C.     PROSTATE, NEEDLE CORE BIOPSY, RIGHT LATERAL MID:  Benign prostatic tissue  D.     PROSTATE, NEEDLE CORE BIOPSY, RIGHT LATERAL BASE:  Benign prostatic tissue  E.     PROSTATE, NEEDLE CORE BIOPSY, LEFT MID:  Benign prostatic tissue  F.     PROSTATE, NEEDLE CORE BIOPSY, LEFT BASE:  Benign prostatic tissue with focal chronic inflammation  G.     PROSTATE, NEEDLE CORE BIOPSY, LEFT LATERAL MID:  Benign prostatic tissue  H.     PROSTATE, NEEDLE CORE BIOPSY, LEFT LATERAL BASE:  Benign prostatic tissue with focal acute and chronic inflammation    CAM    CLINICAL HISTORY:  Elevated prostate specific antigen (PSA)    SPECIMENS RECEIVED:  A.    PROSTATE, NEEDLE CORE BIOPSY, RIGHT MID  B.    PROSTATE, NEEDLE CORE BIOPSY, RIGHT BASE  C.    PROSTATE, NEEDLE CORE BIOPSY, RIGHT LATERAL MID  D.    PROSTATE, NEEDLE CORE BIOPSY, RIGHT LATERAL BASE  E.    PROSTATE, NEEDLE CORE BIOPSY, LEFT MID  F.    PROSTATE, NEEDLE CORE BIOPSY,                           LEFT BASE  G.    PROSTATE, NEEDLE CORE BIOPSY, LEFT LATERAL MID  H.    PROSTATE, NEEDLE CORE BIOPSY, LEFT LATERAL BASE    MICROSCOPIC DESCRIPTION:  Tissue blocks are prepared and slides are examined microscopically on all  specimens. See diagnosis for details.    Professional interpretation rendered by Tanya Dubose M.D., F.C.A.P. at  [email or web address], [employer], [address] [place] [address].    GROSS DESCRIPTION:  A.    Labeled \"right mid\" is a single needle core biopsy of tan soft tissue  measuring 0.9 x 0.1 x 0.1 cm, submitted entirely in 1 block.  IXC  B.    Labeled \"right base\" is a single needle core " "biopsy of tan soft tissue  measuring 1.6 x 0.1 x 0.1 cm, submitted entirely in 1 block.  C.    Labeled \"right lateral mid\" is a single needle core biopsy of tan soft tissue  measuring 1.4 x 0.1 x 0.1 cm, submitted entirely in 1 block.  D.    Labeled \"right lateral base\" is a single needle core biopsy of tan soft tissue  measuring 2.2 x 0.1 x 0.1 cm, submitted entirely in 1 block.  E.                              Labeled \"left mid\" is a single needle core biopsy of tan soft tissue  measuring 0.9 x 0.1 x 0.1 cm, submitted entirely in 1 block.  F.    Labeled \"left base\" is a single needle core biopsy of tan soft tissue  measuring 1.6 x 0.1 x 0.1 cm, submitted entirely in 1 block.  G.    Labeled \"left lateral mid\" is a single needle core biopsy of tan soft tissue  measuring 1.8 x 0.1 x 0.1 cm, submitted entirely in 1 block.  H.    Labeled \"left lateral base\" is a single needle core biopsy of tan soft tissue  measuring 1.0 x 0.1 x 0.1 cm, submitted entirely in 1 block.    REVIEWED, DIAGNOSED AND ELECTRONICALLY  SIGNED BY:    Tanya Dubose M.D., FDaleCDaleAJEREMIE.  CPT CODES:  88305x8          Procedure:       Assessment/Plan:   Elevated prostate specific antigen-we discussed the diagnosis of elevated prostate-specific antigen.  I explained the pathophysiology of PSA.  It is a serine protease that's function in the male reproductive tract is to facilitate the liquefaction of semen.  It is for this reason the body does not want it freely floating in the serum and why typically bound tightly to albumin.  We discussed why we used both a PSA free and total to determine the need for more aggressive therapy. I discussed the normal range.  Additionally, it was in the range of 1 to 4, but more recently has been downgraded to something less than 2 or even approaching 1.  I discussed the risk of family history, particularly the fact that the average male has a 14% risk of prostate cancer and that in the face of a positive diagnosis in " a father it will tablet and any other first-generation relative continued tablet insofar that a father and brother with prostate cancer will produce almost a 50% risk of prostate cancer.  I discussed the use of the temporal use of PSA as the best option for monitoring.  We also discussed the fact that an elevated PSA is an isolated event does not mean that this is prostate cancer and should not engender worry in this regard. I discussed other things that can elevate PSA including constipation, prostatitis, infection, recent intercourse etc., as well as the risks and benefits associated with this.  Also discussed the fact that this is with a dilutional test and as a consequence of such were present produce slight variations on a single specimen.  Further discussed the risks and benefits of a prostate biopsy as well.  His biopsy was negative but we will get a watch and pretty closely I am in to make the addition of Proscar  Family history of prostate cancer-he has a family history of prostate cancer.  I discussed with him the significant indications as to what this means.  The generally expected incidence of prostate cancer in the population is approximately 14%.  However, in first-degree relatives such as father or brother, it doubles at risk and doubles again with more first-degree relatives.  A second-degree relative does not make it go up that much.  This is also true of breast and ovarian cancer.              This document has been electronically signed by LUPE RAE MD January 28, 2025 10:24 EST    Dictated Utilizing Dragon Dictation: Part of this note may be an electronic transcription/translation of spoken language to printed text using the Dragon Dictation System.

## 2025-02-07 ENCOUNTER — TELEPHONE (OUTPATIENT)
Dept: GASTROENTEROLOGY | Facility: CLINIC | Age: 60
End: 2025-02-07

## 2025-02-12 ENCOUNTER — TELEPHONE (OUTPATIENT)
Dept: UROLOGY | Facility: CLINIC | Age: 60
End: 2025-02-12
Payer: COMMERCIAL

## 2025-02-12 NOTE — TELEPHONE ENCOUNTER
PA for Finasteride has been sent to pt's insurance company, currently waiting on a response back.